# Patient Record
Sex: MALE | Race: WHITE | NOT HISPANIC OR LATINO | URBAN - METROPOLITAN AREA
[De-identification: names, ages, dates, MRNs, and addresses within clinical notes are randomized per-mention and may not be internally consistent; named-entity substitution may affect disease eponyms.]

---

## 2017-11-26 ENCOUNTER — EMERGENCY (EMERGENCY)
Facility: HOSPITAL | Age: 62
LOS: 1 days | Discharge: HOME | End: 2017-11-26
Attending: EMERGENCY MEDICINE
Payer: COMMERCIAL

## 2017-11-26 VITALS
SYSTOLIC BLOOD PRESSURE: 143 MMHG | TEMPERATURE: 98 F | RESPIRATION RATE: 20 BRPM | OXYGEN SATURATION: 100 % | WEIGHT: 184.97 LBS | HEIGHT: 73 IN | HEART RATE: 96 BPM | DIASTOLIC BLOOD PRESSURE: 80 MMHG

## 2017-11-26 VITALS
SYSTOLIC BLOOD PRESSURE: 134 MMHG | RESPIRATION RATE: 16 BRPM | TEMPERATURE: 98 F | OXYGEN SATURATION: 100 % | HEART RATE: 94 BPM | DIASTOLIC BLOOD PRESSURE: 76 MMHG

## 2017-11-26 LAB
APPEARANCE UR: CLEAR — SIGNIFICANT CHANGE UP
BACTERIA # UR AUTO: ABNORMAL
BILIRUB UR-MCNC: NEGATIVE — SIGNIFICANT CHANGE UP
COLOR SPEC: YELLOW — SIGNIFICANT CHANGE UP
DIFF PNL FLD: ABNORMAL
GLUCOSE UR QL: NEGATIVE MG/DL — SIGNIFICANT CHANGE UP
KETONES UR-MCNC: ABNORMAL
LEUKOCYTE ESTERASE UR-ACNC: ABNORMAL
NITRITE UR-MCNC: NEGATIVE — SIGNIFICANT CHANGE UP
PH UR: 5 — SIGNIFICANT CHANGE UP (ref 5–8)
PROT UR-MCNC: 15 MG/DL
RBC CASTS # UR COMP ASSIST: ABNORMAL /HPF (ref 0–4)
SP GR SPEC: 1.02 — SIGNIFICANT CHANGE UP (ref 1.01–1.02)
UROBILINOGEN FLD QL: NEGATIVE MG/DL — SIGNIFICANT CHANGE UP
WBC UR QL: SIGNIFICANT CHANGE UP

## 2017-11-26 PROCEDURE — 76775 US EXAM ABDO BACK WALL LIM: CPT | Mod: 26

## 2017-11-26 PROCEDURE — 99284 EMERGENCY DEPT VISIT MOD MDM: CPT

## 2017-11-26 PROCEDURE — 81001 URINALYSIS AUTO W/SCOPE: CPT

## 2017-11-26 PROCEDURE — 96374 THER/PROPH/DIAG INJ IV PUSH: CPT

## 2017-11-26 PROCEDURE — 76775 US EXAM ABDO BACK WALL LIM: CPT

## 2017-11-26 PROCEDURE — 99284 EMERGENCY DEPT VISIT MOD MDM: CPT | Mod: 25

## 2017-11-26 RX ORDER — SODIUM CHLORIDE 9 MG/ML
3 INJECTION INTRAMUSCULAR; INTRAVENOUS; SUBCUTANEOUS EVERY 8 HOURS
Qty: 0 | Refills: 0 | Status: DISCONTINUED | OUTPATIENT
Start: 2017-11-26 | End: 2017-11-30

## 2017-11-26 RX ORDER — TRAMADOL HYDROCHLORIDE 50 MG/1
1 TABLET ORAL
Qty: 10 | Refills: 0 | OUTPATIENT
Start: 2017-11-26 | End: 2017-11-29

## 2017-11-26 RX ORDER — KETOROLAC TROMETHAMINE 30 MG/ML
30 SYRINGE (ML) INJECTION ONCE
Qty: 0 | Refills: 0 | Status: DISCONTINUED | OUTPATIENT
Start: 2017-11-26 | End: 2017-11-26

## 2017-11-26 RX ORDER — SODIUM CHLORIDE 9 MG/ML
1000 INJECTION INTRAMUSCULAR; INTRAVENOUS; SUBCUTANEOUS ONCE
Qty: 0 | Refills: 0 | Status: COMPLETED | OUTPATIENT
Start: 2017-11-26 | End: 2017-11-26

## 2017-11-26 RX ADMIN — Medication 30 MILLIGRAM(S): at 12:24

## 2017-11-26 RX ADMIN — Medication 30 MILLIGRAM(S): at 13:00

## 2017-11-26 RX ADMIN — SODIUM CHLORIDE 3 MILLILITER(S): 9 INJECTION INTRAMUSCULAR; INTRAVENOUS; SUBCUTANEOUS at 13:40

## 2017-11-26 RX ADMIN — SODIUM CHLORIDE 2000 MILLILITER(S): 9 INJECTION INTRAMUSCULAR; INTRAVENOUS; SUBCUTANEOUS at 12:25

## 2017-11-26 NOTE — ED ADULT NURSE NOTE - OBJECTIVE STATEMENT
pt reports groin and back pain, states hx of kidney stones. pt appears comfortable, AOX3, afebrile, even and unlabored resps, denies hematuria

## 2017-11-26 NOTE — ED STATDOCS - ATTENDING CONTRIBUTION TO CARE
I, Gemma Ayala, performed the initial face to face bedside interview with this patient regarding history of present illness, review of symptoms and relevant past medical, social and family history.  I completed an independent physical examination.  I was the initial provider who evaluated this patient. I have signed out the follow up of any pending tests (i.e. labs, radiological studies) to the ACP.  I have communicated the patient’s plan of care and disposition with the ACP.

## 2017-11-26 NOTE — ED STATDOCS - MEDICAL DECISION MAKING DETAILS
Hx of kidney stones presenting with flank pain radiating to groin similar to kidney stone in past. Appears comfortable. Will treat with Toradol, UA to r/o possible infected stone and renal US as pt has had multiple stones in the past.

## 2017-11-26 NOTE — ED STATDOCS - CHPI ED SYMPTOM NEG
no vomiting/no fever/no nausea/no hematuria/no dysuria/NO URINARY URGENCY, NO SORE THROAT, NO COUGH, NO CHEST PAIN, NO SHORTNESS OF BREATH, NO SWELLING TO LOWER EXTREMITIES

## 2017-11-26 NOTE — ED STATDOCS - PROGRESS NOTE DETAILS
I reviewed US findings. Negative for hydro. Pt US reviewed and Pt had no Hydronephrosis or Renal calculi. Pt denies nay current pain and states that the pain has resolved. Pt D/c and will F/U with Urologist as discussed. Pt made aware of Septated renal cyst and will F/U with urologist.

## 2017-11-26 NOTE — ED STATDOCS - OBJECTIVE STATEMENT
62 year old male, with hx of kidney stones x 7 years ago, presenting to the ED complaining of right flank pain that onset 3 days ago. Pt states that he his sx feel similar to those of his prior hx of kidney stones. He states that his pain worsened last night and began to radiate to his groin. Pt states he has had constipation for the past 2-3 days and has weak flow while urinating, but he denies having any nausea, vomiting, fever, chills, hematuria, dysuria, urinary urgency, sore throat, cough, chest pain, shortness of breath, or swelling to his lower extremities. He denies having any other pertinent PMHx and states that he has NKDA. Pt states that he took Aleve for his pain last night at 0000. He states that his PSHx includes orthopedic surgery. No further complaints at this time.

## 2017-11-28 ENCOUNTER — EMERGENCY (EMERGENCY)
Facility: HOSPITAL | Age: 62
LOS: 1 days | Discharge: HOME | End: 2017-11-28
Attending: STUDENT IN AN ORGANIZED HEALTH CARE EDUCATION/TRAINING PROGRAM | Admitting: STUDENT IN AN ORGANIZED HEALTH CARE EDUCATION/TRAINING PROGRAM
Payer: COMMERCIAL

## 2017-11-28 VITALS
RESPIRATION RATE: 20 BRPM | OXYGEN SATURATION: 100 % | HEIGHT: 73 IN | TEMPERATURE: 98 F | WEIGHT: 184.97 LBS | DIASTOLIC BLOOD PRESSURE: 82 MMHG | SYSTOLIC BLOOD PRESSURE: 143 MMHG | HEART RATE: 88 BPM

## 2017-11-28 LAB
ALBUMIN SERPL ELPH-MCNC: 4.3 G/DL — SIGNIFICANT CHANGE UP (ref 3.3–5.2)
ALP SERPL-CCNC: 68 U/L — SIGNIFICANT CHANGE UP (ref 40–120)
ALT FLD-CCNC: 20 U/L — SIGNIFICANT CHANGE UP
ANION GAP SERPL CALC-SCNC: 12 MMOL/L — SIGNIFICANT CHANGE UP (ref 5–17)
APPEARANCE UR: CLEAR — SIGNIFICANT CHANGE UP
AST SERPL-CCNC: 22 U/L — SIGNIFICANT CHANGE UP
BACTERIA # UR AUTO: ABNORMAL
BASOPHILS # BLD AUTO: 0 K/UL — SIGNIFICANT CHANGE UP (ref 0–0.2)
BASOPHILS NFR BLD AUTO: 0.1 % — SIGNIFICANT CHANGE UP (ref 0–2)
BILIRUB SERPL-MCNC: 0.4 MG/DL — SIGNIFICANT CHANGE UP (ref 0.4–2)
BILIRUB UR-MCNC: NEGATIVE — SIGNIFICANT CHANGE UP
BUN SERPL-MCNC: 22 MG/DL — HIGH (ref 8–20)
CALCIUM SERPL-MCNC: 9.9 MG/DL — SIGNIFICANT CHANGE UP (ref 8.6–10.2)
CHLORIDE SERPL-SCNC: 97 MMOL/L — LOW (ref 98–107)
CO2 SERPL-SCNC: 26 MMOL/L — SIGNIFICANT CHANGE UP (ref 22–29)
COLOR SPEC: YELLOW — SIGNIFICANT CHANGE UP
CREAT SERPL-MCNC: 1.98 MG/DL — HIGH (ref 0.5–1.3)
DIFF PNL FLD: ABNORMAL
EOSINOPHIL # BLD AUTO: 0.2 K/UL — SIGNIFICANT CHANGE UP (ref 0–0.5)
EOSINOPHIL NFR BLD AUTO: 3 % — SIGNIFICANT CHANGE UP (ref 0–5)
EPI CELLS # UR: SIGNIFICANT CHANGE UP
GLUCOSE SERPL-MCNC: 86 MG/DL — SIGNIFICANT CHANGE UP (ref 70–115)
GLUCOSE UR QL: NEGATIVE MG/DL — SIGNIFICANT CHANGE UP
HCT VFR BLD CALC: 46.3 % — SIGNIFICANT CHANGE UP (ref 42–52)
HGB BLD-MCNC: 15.9 G/DL — SIGNIFICANT CHANGE UP (ref 14–18)
KETONES UR-MCNC: ABNORMAL
LEUKOCYTE ESTERASE UR-ACNC: ABNORMAL
LIDOCAIN IGE QN: 35 U/L — SIGNIFICANT CHANGE UP (ref 22–51)
LYMPHOCYTES # BLD AUTO: 1.4 K/UL — SIGNIFICANT CHANGE UP (ref 1–4.8)
LYMPHOCYTES # BLD AUTO: 19.6 % — LOW (ref 20–55)
MCHC RBC-ENTMCNC: 30.1 PG — SIGNIFICANT CHANGE UP (ref 27–31)
MCHC RBC-ENTMCNC: 34.3 G/DL — SIGNIFICANT CHANGE UP (ref 32–36)
MCV RBC AUTO: 87.7 FL — SIGNIFICANT CHANGE UP (ref 80–94)
MONOCYTES # BLD AUTO: 1.2 K/UL — HIGH (ref 0–0.8)
MONOCYTES NFR BLD AUTO: 16.7 % — HIGH (ref 3–10)
NEUTROPHILS # BLD AUTO: 4.4 K/UL — SIGNIFICANT CHANGE UP (ref 1.8–8)
NEUTROPHILS NFR BLD AUTO: 60.1 % — SIGNIFICANT CHANGE UP (ref 37–73)
NITRITE UR-MCNC: NEGATIVE — SIGNIFICANT CHANGE UP
PH UR: 5 — SIGNIFICANT CHANGE UP (ref 5–8)
PLATELET # BLD AUTO: 200 K/UL — SIGNIFICANT CHANGE UP (ref 150–400)
POTASSIUM SERPL-MCNC: 4.9 MMOL/L — SIGNIFICANT CHANGE UP (ref 3.5–5.3)
POTASSIUM SERPL-SCNC: 4.9 MMOL/L — SIGNIFICANT CHANGE UP (ref 3.5–5.3)
PROT SERPL-MCNC: 7.7 G/DL — SIGNIFICANT CHANGE UP (ref 6.6–8.7)
PROT UR-MCNC: 30 MG/DL
RBC # BLD: 5.28 M/UL — SIGNIFICANT CHANGE UP (ref 4.6–6.2)
RBC # FLD: 13.4 % — SIGNIFICANT CHANGE UP (ref 11–15.6)
RBC CASTS # UR COMP ASSIST: ABNORMAL /HPF (ref 0–4)
SODIUM SERPL-SCNC: 135 MMOL/L — SIGNIFICANT CHANGE UP (ref 135–145)
SP GR SPEC: 1.02 — SIGNIFICANT CHANGE UP (ref 1.01–1.02)
UROBILINOGEN FLD QL: 1 MG/DL
WBC # BLD: 7.4 K/UL — SIGNIFICANT CHANGE UP (ref 4.8–10.8)
WBC # FLD AUTO: 7.4 K/UL — SIGNIFICANT CHANGE UP (ref 4.8–10.8)
WBC UR QL: SIGNIFICANT CHANGE UP

## 2017-11-28 PROCEDURE — 36415 COLL VENOUS BLD VENIPUNCTURE: CPT

## 2017-11-28 PROCEDURE — 74176 CT ABD & PELVIS W/O CONTRAST: CPT | Mod: 26

## 2017-11-28 PROCEDURE — 81001 URINALYSIS AUTO W/SCOPE: CPT

## 2017-11-28 PROCEDURE — 99284 EMERGENCY DEPT VISIT MOD MDM: CPT | Mod: 25

## 2017-11-28 PROCEDURE — 80053 COMPREHEN METABOLIC PANEL: CPT

## 2017-11-28 PROCEDURE — 74176 CT ABD & PELVIS W/O CONTRAST: CPT

## 2017-11-28 PROCEDURE — 83690 ASSAY OF LIPASE: CPT

## 2017-11-28 PROCEDURE — 85027 COMPLETE CBC AUTOMATED: CPT

## 2017-11-28 RX ORDER — TAMSULOSIN HYDROCHLORIDE 0.4 MG/1
1 CAPSULE ORAL
Qty: 5 | Refills: 0 | OUTPATIENT
Start: 2017-11-28 | End: 2017-12-03

## 2017-11-28 RX ORDER — TAMSULOSIN HYDROCHLORIDE 0.4 MG/1
1 CAPSULE ORAL
Qty: 7 | Refills: 0 | OUTPATIENT
Start: 2017-11-28 | End: 2017-12-05

## 2017-11-28 RX ORDER — SODIUM CHLORIDE 9 MG/ML
1000 INJECTION INTRAMUSCULAR; INTRAVENOUS; SUBCUTANEOUS
Qty: 0 | Refills: 0 | Status: DISCONTINUED | OUTPATIENT
Start: 2017-11-28 | End: 2017-12-02

## 2017-11-28 RX ORDER — SODIUM CHLORIDE 9 MG/ML
3 INJECTION INTRAMUSCULAR; INTRAVENOUS; SUBCUTANEOUS ONCE
Qty: 0 | Refills: 0 | Status: COMPLETED | OUTPATIENT
Start: 2017-11-28 | End: 2017-11-28

## 2017-11-28 RX ORDER — SODIUM CHLORIDE 9 MG/ML
1000 INJECTION INTRAMUSCULAR; INTRAVENOUS; SUBCUTANEOUS ONCE
Qty: 0 | Refills: 0 | Status: COMPLETED | OUTPATIENT
Start: 2017-11-28 | End: 2017-11-28

## 2017-11-28 RX ADMIN — SODIUM CHLORIDE 3 MILLILITER(S): 9 INJECTION INTRAMUSCULAR; INTRAVENOUS; SUBCUTANEOUS at 04:36

## 2017-11-28 RX ADMIN — SODIUM CHLORIDE 125 MILLILITER(S): 9 INJECTION INTRAMUSCULAR; INTRAVENOUS; SUBCUTANEOUS at 06:09

## 2017-11-28 RX ADMIN — SODIUM CHLORIDE 1000 MILLILITER(S): 9 INJECTION INTRAMUSCULAR; INTRAVENOUS; SUBCUTANEOUS at 04:07

## 2017-11-28 NOTE — ED PROVIDER NOTE - OBJECTIVE STATEMENT
63 y/o male with PMHx kidney stones, and PSHx hernia repair presents to the ED with c/o right sided flank pain, onset yesterday. Pt states he was evaluated at [facility] for similar complaints yesterday, US preformed found within normal limits, pt was d/c home with Tramadol for pain control. Pt states medication has not alleviated pain. Attempted to alleviate with muscle relaxers, which relief of pain. Pt states at worse pain was an 8/10, in the ED pt states pain is a 2/10. Pt reports constipation, approximately 2 minor bowel movements in the past 4 days. Per pt sitting aggregates pain, standing alleviates pain. Denies n/v/d, fever, and any other acute symptoms and complaints at this time. 63 y/o male with PMHx kidney stones, and PSHx hernia repair presents to the ED with c/o intermittent right sided flank pain, onset this past Sunday yesterday. Pt states he was evaluated at [facility] for similar complaints yesterday, US preformed found within normal limits, pt was d/c home with Tramadol for pain control. Pt states medication has not alleviated pain. Attempted to alleviate with muscle relaxers, which relief of pain. Pt states at worse pain was an 8/10, in the ED pt states pain is a 2/10. Pt reports constipation, approximately 2 minor bowel movements in the past 4 days. Per pt sitting aggregates pain, standing alleviates pain. Denies n/v/d, fever, and any other acute symptoms and complaints at this time.

## 2017-11-28 NOTE — ED PROVIDER NOTE - PROGRESS NOTE DETAILS
Patient feeling better. Results discussed patient cautioned against nsaid use and will need for follow-up with urologist.

## 2017-11-28 NOTE — ED PROVIDER NOTE - MEDICAL DECISION MAKING DETAILS
Will evaluate for cause of recurrent pain, likely secondary to renal colic given hx and presentation.

## 2017-11-28 NOTE — ED ADULT NURSE NOTE - OBJECTIVE STATEMENT
Pt received in A6R c/o r flank pain and a feeling of "low urine pressure" since thursday night. Pt was seen here on Sunday for same complaint. Pt states the pain comes and goes. Pt denies nausea, vomiting, fever/chills, pain upon urination, urinary retention.

## 2017-11-29 ENCOUNTER — INPATIENT (INPATIENT)
Facility: HOSPITAL | Age: 62
LOS: 4 days | Discharge: HOME | DRG: 694 | End: 2017-12-04
Attending: HOSPITALIST | Admitting: INTERNAL MEDICINE
Payer: COMMERCIAL

## 2017-11-29 VITALS
HEART RATE: 94 BPM | SYSTOLIC BLOOD PRESSURE: 134 MMHG | HEIGHT: 73 IN | OXYGEN SATURATION: 99 % | WEIGHT: 190.04 LBS | DIASTOLIC BLOOD PRESSURE: 86 MMHG | TEMPERATURE: 98 F | RESPIRATION RATE: 18 BRPM

## 2017-11-29 DIAGNOSIS — N20.0 CALCULUS OF KIDNEY: ICD-10-CM

## 2017-11-29 LAB
ANION GAP SERPL CALC-SCNC: 14 MMOL/L — SIGNIFICANT CHANGE UP (ref 5–17)
APPEARANCE UR: CLEAR — SIGNIFICANT CHANGE UP
BILIRUB UR-MCNC: NEGATIVE — SIGNIFICANT CHANGE UP
BUN SERPL-MCNC: 15 MG/DL — SIGNIFICANT CHANGE UP (ref 8–20)
CALCIUM SERPL-MCNC: 9.9 MG/DL — SIGNIFICANT CHANGE UP (ref 8.6–10.2)
CHLORIDE SERPL-SCNC: 100 MMOL/L — SIGNIFICANT CHANGE UP (ref 98–107)
CO2 SERPL-SCNC: 26 MMOL/L — SIGNIFICANT CHANGE UP (ref 22–29)
COLOR SPEC: YELLOW — SIGNIFICANT CHANGE UP
CREAT SERPL-MCNC: 1.98 MG/DL — HIGH (ref 0.5–1.3)
DIFF PNL FLD: NEGATIVE — SIGNIFICANT CHANGE UP
GLUCOSE SERPL-MCNC: 113 MG/DL — SIGNIFICANT CHANGE UP (ref 70–115)
GLUCOSE UR QL: NEGATIVE MG/DL — SIGNIFICANT CHANGE UP
HCT VFR BLD CALC: 45.1 % — SIGNIFICANT CHANGE UP (ref 42–52)
HGB BLD-MCNC: 14.6 G/DL — SIGNIFICANT CHANGE UP (ref 14–18)
KETONES UR-MCNC: NEGATIVE — SIGNIFICANT CHANGE UP
LEUKOCYTE ESTERASE UR-ACNC: NEGATIVE — SIGNIFICANT CHANGE UP
MCHC RBC-ENTMCNC: 27.9 PG — SIGNIFICANT CHANGE UP (ref 27–31)
MCHC RBC-ENTMCNC: 32.4 G/DL — SIGNIFICANT CHANGE UP (ref 32–36)
MCV RBC AUTO: 86.2 FL — SIGNIFICANT CHANGE UP (ref 80–94)
NITRITE UR-MCNC: NEGATIVE — SIGNIFICANT CHANGE UP
PH UR: 5 — SIGNIFICANT CHANGE UP (ref 5–8)
PLATELET # BLD AUTO: 193 K/UL — SIGNIFICANT CHANGE UP (ref 150–400)
POTASSIUM SERPL-MCNC: 4.6 MMOL/L — SIGNIFICANT CHANGE UP (ref 3.5–5.3)
POTASSIUM SERPL-SCNC: 4.6 MMOL/L — SIGNIFICANT CHANGE UP (ref 3.5–5.3)
PROT UR-MCNC: NEGATIVE MG/DL — SIGNIFICANT CHANGE UP
RBC # BLD: 5.23 M/UL — SIGNIFICANT CHANGE UP (ref 4.6–6.2)
RBC # FLD: 12.8 % — SIGNIFICANT CHANGE UP (ref 11–15.6)
SODIUM SERPL-SCNC: 140 MMOL/L — SIGNIFICANT CHANGE UP (ref 135–145)
SP GR SPEC: 1.01 — SIGNIFICANT CHANGE UP (ref 1.01–1.02)
UROBILINOGEN FLD QL: NEGATIVE MG/DL — SIGNIFICANT CHANGE UP
WBC # BLD: 8.2 K/UL — SIGNIFICANT CHANGE UP (ref 4.8–10.8)
WBC # FLD AUTO: 8.2 K/UL — SIGNIFICANT CHANGE UP (ref 4.8–10.8)

## 2017-11-29 PROCEDURE — 74176 CT ABD & PELVIS W/O CONTRAST: CPT | Mod: 26

## 2017-11-29 PROCEDURE — 99285 EMERGENCY DEPT VISIT HI MDM: CPT

## 2017-11-29 PROCEDURE — 71010: CPT | Mod: 26

## 2017-11-29 RX ORDER — ENOXAPARIN SODIUM 100 MG/ML
40 INJECTION SUBCUTANEOUS EVERY 24 HOURS
Qty: 0 | Refills: 0 | Status: DISCONTINUED | OUTPATIENT
Start: 2017-11-29 | End: 2017-12-01

## 2017-11-29 RX ORDER — MORPHINE SULFATE 50 MG/1
2 CAPSULE, EXTENDED RELEASE ORAL EVERY 6 HOURS
Qty: 0 | Refills: 0 | Status: DISCONTINUED | OUTPATIENT
Start: 2017-11-29 | End: 2017-12-01

## 2017-11-29 RX ORDER — TAMSULOSIN HYDROCHLORIDE 0.4 MG/1
0.4 CAPSULE ORAL AT BEDTIME
Qty: 0 | Refills: 0 | Status: DISCONTINUED | OUTPATIENT
Start: 2017-11-30 | End: 2017-12-01

## 2017-11-29 RX ORDER — SODIUM CHLORIDE 9 MG/ML
1000 INJECTION, SOLUTION INTRAVENOUS
Qty: 0 | Refills: 0 | Status: DISCONTINUED | OUTPATIENT
Start: 2017-11-29 | End: 2017-12-01

## 2017-11-29 RX ORDER — SODIUM CHLORIDE 9 MG/ML
1000 INJECTION INTRAMUSCULAR; INTRAVENOUS; SUBCUTANEOUS ONCE
Qty: 0 | Refills: 0 | Status: COMPLETED | OUTPATIENT
Start: 2017-11-29 | End: 2017-11-29

## 2017-11-29 RX ORDER — OXYCODONE AND ACETAMINOPHEN 5; 325 MG/1; MG/1
2 TABLET ORAL EVERY 6 HOURS
Qty: 0 | Refills: 0 | Status: DISCONTINUED | OUTPATIENT
Start: 2017-11-29 | End: 2017-12-01

## 2017-11-29 RX ORDER — SODIUM CHLORIDE 9 MG/ML
3 INJECTION INTRAMUSCULAR; INTRAVENOUS; SUBCUTANEOUS ONCE
Qty: 0 | Refills: 0 | Status: COMPLETED | OUTPATIENT
Start: 2017-11-29 | End: 2017-11-29

## 2017-11-29 RX ORDER — MORPHINE SULFATE 50 MG/1
4 CAPSULE, EXTENDED RELEASE ORAL ONCE
Qty: 0 | Refills: 0 | Status: DISCONTINUED | OUTPATIENT
Start: 2017-11-29 | End: 2017-11-29

## 2017-11-29 RX ORDER — HYDROMORPHONE HYDROCHLORIDE 2 MG/ML
2 INJECTION INTRAMUSCULAR; INTRAVENOUS; SUBCUTANEOUS ONCE
Qty: 0 | Refills: 0 | Status: DISCONTINUED | OUTPATIENT
Start: 2017-11-29 | End: 2017-11-29

## 2017-11-29 RX ADMIN — SODIUM CHLORIDE 3 MILLILITER(S): 9 INJECTION INTRAMUSCULAR; INTRAVENOUS; SUBCUTANEOUS at 23:04

## 2017-11-29 RX ADMIN — SODIUM CHLORIDE 2000 MILLILITER(S): 9 INJECTION INTRAMUSCULAR; INTRAVENOUS; SUBCUTANEOUS at 18:28

## 2017-11-29 RX ADMIN — MORPHINE SULFATE 4 MILLIGRAM(S): 50 CAPSULE, EXTENDED RELEASE ORAL at 18:28

## 2017-11-29 RX ADMIN — MORPHINE SULFATE 2 MILLIGRAM(S): 50 CAPSULE, EXTENDED RELEASE ORAL at 23:26

## 2017-11-29 RX ADMIN — SODIUM CHLORIDE 150 MILLILITER(S): 9 INJECTION, SOLUTION INTRAVENOUS at 23:26

## 2017-11-29 RX ADMIN — MORPHINE SULFATE 2 MILLIGRAM(S): 50 CAPSULE, EXTENDED RELEASE ORAL at 23:15

## 2017-11-29 RX ADMIN — HYDROMORPHONE HYDROCHLORIDE 2 MILLIGRAM(S): 2 INJECTION INTRAMUSCULAR; INTRAVENOUS; SUBCUTANEOUS at 20:11

## 2017-11-29 NOTE — ED ADULT NURSE NOTE - OBJECTIVE STATEMENT
Pt was diagnosed with kidney stones. Was seen here last night and sent home with Percocet and Flomax, pain is getting worse. Pt states that he doesn't think he passed a stone.

## 2017-11-29 NOTE — ED STATDOCS - PROGRESS NOTE DETAILS
urology consulted Dr. Damien Saunders- [phone] - will see patient either tonight or tomorrow am PA NOTE: Pt seen by intake physician and hpi/orders/plan reviewed. PT presenting to ED with complaints of right flank pain x 6 days ago.  Patient was seen here yesterday and diagnosed with a kidney stone and d/c'd with pain meds and urology follow up.  Patient is back due to worsening symptoms.  PE: GEN: Awake, alert,  NAD,  EYES: PERRL CARDIAC: Reg rate and rhythm, S1,S2, RRR  RESP: No distress noted. Lungs CTA bilaterally no wheeze, ronchi, rales. ABD: soft,  non-tender, no guarding. . NEURO: AOx3, no focal deficits   PLAN: labs, CT and urology consult

## 2017-11-29 NOTE — ED STATDOCS - OBJECTIVE STATEMENT
62 year old male, with hx of kidney stones, presenting to the ED complaining of right flank pain that onset 6 days ago. Pt states that he has visited the ED twice in the past 3 days and had an US performed which was negative. He states that he was treated with Tramadol, which did not relieve his pain yesterday. Pt states that he returned to the ED yesterday and had a CT performed which was positive for a 3mm kidney stone that is close to the bladder. He states that he was then prescribed oxycodone and acetaminophen, which brought relief for about 12 hours. Pt states that he began to take the medication every 3 hours which again only brought slight relief. He states that he also has lower back pain associated with his flank pain. Pt states that he has had hx of abnormal creatinine levels. Pt states that he chews approximately 12 Advils and Aleves per week. He states that he has not been able to sleep due to his pain. Pt states that he has not followed up with a urologist for his sx yet. He states that he has been using hot compresses and taking hot baths to relieve his pain. Pt states that he has been taking Flomax for his pain as well. No further complaints at this time.

## 2017-11-29 NOTE — ED ADULT NURSE NOTE - CHIEF COMPLAINT QUOTE
Pt was seen her twice for a kidney stone, discharged yesterday morning.  Pt states his pain level now is 6/10

## 2017-11-29 NOTE — ED STATDOCS - ATTENDING CONTRIBUTION TO CARE
I, Nas Stoner, performed the initial face to face bedside interview with this patient regarding history of present illness, review of symptoms and relevant past medical, social and family history.  I completed an independent physical examination.  I was the initial provider who evaluated this patient. I have signed out the follow up of any pending tests (i.e. labs, radiological studies) to the ACP.  I have communicated the patient’s plan of care and disposition with the ACP.

## 2017-11-29 NOTE — ED STATDOCS - CARE PLAN
Principal Discharge DX:	Kidney stones Principal Discharge DX:	Kidney stones  Secondary Diagnosis:	Hydronephrosis due to obstruction of ureter  Secondary Diagnosis:	ELDON (acute kidney injury)

## 2017-11-29 NOTE — ED STATDOCS - MEDICAL DECISION MAKING DETAILS
Will treat with hydration, pain control, and re-image to evaluate for progression or worsening hydro given abnormal renal function. No NSAIDs due to kidney function.

## 2017-11-30 DIAGNOSIS — Z98.890 OTHER SPECIFIED POSTPROCEDURAL STATES: Chronic | ICD-10-CM

## 2017-11-30 DIAGNOSIS — N17.9 ACUTE KIDNEY FAILURE, UNSPECIFIED: ICD-10-CM

## 2017-11-30 DIAGNOSIS — R52 PAIN, UNSPECIFIED: ICD-10-CM

## 2017-11-30 DIAGNOSIS — N13.30 UNSPECIFIED HYDRONEPHROSIS: ICD-10-CM

## 2017-11-30 DIAGNOSIS — Z29.9 ENCOUNTER FOR PROPHYLACTIC MEASURES, UNSPECIFIED: ICD-10-CM

## 2017-11-30 DIAGNOSIS — N20.0 CALCULUS OF KIDNEY: ICD-10-CM

## 2017-11-30 LAB
ALBUMIN SERPL ELPH-MCNC: 3.5 G/DL — SIGNIFICANT CHANGE UP (ref 3.3–5.2)
ALP SERPL-CCNC: 53 U/L — SIGNIFICANT CHANGE UP (ref 40–120)
ALT FLD-CCNC: 13 U/L — SIGNIFICANT CHANGE UP
ANION GAP SERPL CALC-SCNC: 16 MMOL/L — SIGNIFICANT CHANGE UP (ref 5–17)
APTT BLD: 26.2 SEC — LOW (ref 27.5–37.4)
AST SERPL-CCNC: 16 U/L — SIGNIFICANT CHANGE UP
BASOPHILS # BLD AUTO: 0 K/UL — SIGNIFICANT CHANGE UP (ref 0–0.2)
BASOPHILS NFR BLD AUTO: 0.2 % — SIGNIFICANT CHANGE UP (ref 0–2)
BILIRUB SERPL-MCNC: 0.9 MG/DL — SIGNIFICANT CHANGE UP (ref 0.4–2)
BUN SERPL-MCNC: 13 MG/DL — SIGNIFICANT CHANGE UP (ref 8–20)
CALCIUM SERPL-MCNC: 8.8 MG/DL — SIGNIFICANT CHANGE UP (ref 8.6–10.2)
CHLORIDE SERPL-SCNC: 100 MMOL/L — SIGNIFICANT CHANGE UP (ref 98–107)
CO2 SERPL-SCNC: 22 MMOL/L — SIGNIFICANT CHANGE UP (ref 22–29)
CREAT SERPL-MCNC: 1.74 MG/DL — HIGH (ref 0.5–1.3)
EOSINOPHIL # BLD AUTO: 0 K/UL — SIGNIFICANT CHANGE UP (ref 0–0.5)
EOSINOPHIL NFR BLD AUTO: 0.8 % — SIGNIFICANT CHANGE UP (ref 0–5)
GLUCOSE SERPL-MCNC: 106 MG/DL — SIGNIFICANT CHANGE UP (ref 70–115)
HCT VFR BLD CALC: 39.9 % — LOW (ref 42–52)
HGB BLD-MCNC: 14.2 G/DL — SIGNIFICANT CHANGE UP (ref 14–18)
INR BLD: 1.11 RATIO — SIGNIFICANT CHANGE UP (ref 0.88–1.16)
LYMPHOCYTES # BLD AUTO: 1.3 K/UL — SIGNIFICANT CHANGE UP (ref 1–4.8)
LYMPHOCYTES # BLD AUTO: 19.8 % — LOW (ref 20–55)
MAGNESIUM SERPL-MCNC: 2.2 MG/DL — SIGNIFICANT CHANGE UP (ref 1.6–2.6)
MCHC RBC-ENTMCNC: 30.5 PG — SIGNIFICANT CHANGE UP (ref 27–31)
MCHC RBC-ENTMCNC: 35.6 G/DL — SIGNIFICANT CHANGE UP (ref 32–36)
MCV RBC AUTO: 85.6 FL — SIGNIFICANT CHANGE UP (ref 80–94)
MONOCYTES # BLD AUTO: 0.8 K/UL — SIGNIFICANT CHANGE UP (ref 0–0.8)
MONOCYTES NFR BLD AUTO: 12.7 % — HIGH (ref 3–10)
NEUTROPHILS # BLD AUTO: 4.2 K/UL — SIGNIFICANT CHANGE UP (ref 1.8–8)
NEUTROPHILS NFR BLD AUTO: 66 % — SIGNIFICANT CHANGE UP (ref 37–73)
PHOSPHATE SERPL-MCNC: 2.6 MG/DL — SIGNIFICANT CHANGE UP (ref 2.4–4.7)
PLATELET # BLD AUTO: 157 K/UL — SIGNIFICANT CHANGE UP (ref 150–400)
POTASSIUM SERPL-MCNC: 4.7 MMOL/L — SIGNIFICANT CHANGE UP (ref 3.5–5.3)
POTASSIUM SERPL-SCNC: 4.7 MMOL/L — SIGNIFICANT CHANGE UP (ref 3.5–5.3)
PROT SERPL-MCNC: 6.7 G/DL — SIGNIFICANT CHANGE UP (ref 6.6–8.7)
PROTHROM AB SERPL-ACNC: 12.2 SEC — SIGNIFICANT CHANGE UP (ref 9.8–12.7)
RBC # BLD: 4.66 M/UL — SIGNIFICANT CHANGE UP (ref 4.6–6.2)
RBC # FLD: 13 % — SIGNIFICANT CHANGE UP (ref 11–15.6)
SODIUM SERPL-SCNC: 138 MMOL/L — SIGNIFICANT CHANGE UP (ref 135–145)
WBC # BLD: 6.4 K/UL — SIGNIFICANT CHANGE UP (ref 4.8–10.8)
WBC # FLD AUTO: 6.4 K/UL — SIGNIFICANT CHANGE UP (ref 4.8–10.8)

## 2017-11-30 PROCEDURE — 99252 IP/OBS CONSLTJ NEW/EST SF 35: CPT

## 2017-11-30 PROCEDURE — 93010 ELECTROCARDIOGRAM REPORT: CPT

## 2017-11-30 PROCEDURE — 99233 SBSQ HOSP IP/OBS HIGH 50: CPT | Mod: GC

## 2017-11-30 RX ORDER — FAMOTIDINE 10 MG/ML
20 INJECTION INTRAVENOUS DAILY
Qty: 0 | Refills: 0 | Status: DISCONTINUED | OUTPATIENT
Start: 2017-11-30 | End: 2017-12-01

## 2017-11-30 RX ORDER — ACETAMINOPHEN 500 MG
1000 TABLET ORAL ONCE
Qty: 0 | Refills: 0 | Status: COMPLETED | OUTPATIENT
Start: 2017-11-30 | End: 2017-11-30

## 2017-11-30 RX ORDER — POLYETHYLENE GLYCOL 3350 17 G/17G
17 POWDER, FOR SOLUTION ORAL DAILY
Qty: 0 | Refills: 0 | Status: DISCONTINUED | OUTPATIENT
Start: 2017-11-30 | End: 2017-12-01

## 2017-11-30 RX ORDER — INFLUENZA VIRUS VACCINE 15; 15; 15; 15 UG/.5ML; UG/.5ML; UG/.5ML; UG/.5ML
0.5 SUSPENSION INTRAMUSCULAR ONCE
Qty: 0 | Refills: 0 | Status: COMPLETED | OUTPATIENT
Start: 2017-11-30 | End: 2017-11-30

## 2017-11-30 RX ADMIN — OXYCODONE AND ACETAMINOPHEN 2 TABLET(S): 5; 325 TABLET ORAL at 22:26

## 2017-11-30 RX ADMIN — OXYCODONE AND ACETAMINOPHEN 2 TABLET(S): 5; 325 TABLET ORAL at 09:03

## 2017-11-30 RX ADMIN — OXYCODONE AND ACETAMINOPHEN 2 TABLET(S): 5; 325 TABLET ORAL at 14:03

## 2017-11-30 RX ADMIN — OXYCODONE AND ACETAMINOPHEN 2 TABLET(S): 5; 325 TABLET ORAL at 23:20

## 2017-11-30 RX ADMIN — Medication 1000 MILLIGRAM(S): at 19:12

## 2017-11-30 RX ADMIN — MORPHINE SULFATE 2 MILLIGRAM(S): 50 CAPSULE, EXTENDED RELEASE ORAL at 05:36

## 2017-11-30 RX ADMIN — MORPHINE SULFATE 2 MILLIGRAM(S): 50 CAPSULE, EXTENDED RELEASE ORAL at 20:15

## 2017-11-30 RX ADMIN — OXYCODONE AND ACETAMINOPHEN 2 TABLET(S): 5; 325 TABLET ORAL at 14:30

## 2017-11-30 RX ADMIN — SODIUM CHLORIDE 150 MILLILITER(S): 9 INJECTION, SOLUTION INTRAVENOUS at 20:15

## 2017-11-30 RX ADMIN — MORPHINE SULFATE 2 MILLIGRAM(S): 50 CAPSULE, EXTENDED RELEASE ORAL at 13:37

## 2017-11-30 RX ADMIN — TAMSULOSIN HYDROCHLORIDE 0.4 MILLIGRAM(S): 0.4 CAPSULE ORAL at 11:21

## 2017-11-30 RX ADMIN — MORPHINE SULFATE 2 MILLIGRAM(S): 50 CAPSULE, EXTENDED RELEASE ORAL at 12:44

## 2017-11-30 RX ADMIN — SODIUM CHLORIDE 150 MILLILITER(S): 9 INJECTION, SOLUTION INTRAVENOUS at 16:15

## 2017-11-30 RX ADMIN — OXYCODONE AND ACETAMINOPHEN 2 TABLET(S): 5; 325 TABLET ORAL at 11:17

## 2017-11-30 RX ADMIN — MORPHINE SULFATE 2 MILLIGRAM(S): 50 CAPSULE, EXTENDED RELEASE ORAL at 05:53

## 2017-11-30 RX ADMIN — Medication 400 MILLIGRAM(S): at 17:40

## 2017-11-30 RX ADMIN — MORPHINE SULFATE 2 MILLIGRAM(S): 50 CAPSULE, EXTENDED RELEASE ORAL at 20:03

## 2017-11-30 NOTE — H&P ADULT - ASSESSMENT
62 year old male with PMH of renal stones 7 year prior with 3mm right obstructing calculus and intractable pain.

## 2017-11-30 NOTE — H&P ADULT - NSHPPHYSICALEXAM_GEN_ALL_CORE
Vital Signs Last 24 Hrs  T(C): 36.9 (30 Nov 2017 00:39), Max: 36.9 (29 Nov 2017 16:22)  T(F): 98.4 (30 Nov 2017 00:39), Max: 98.4 (29 Nov 2017 16:22)  HR: 92 (30 Nov 2017 00:39) (90 - 94)  BP: 147/82 (30 Nov 2017 00:39) (134/86 - 156/90)  RR: 18 (30 Nov 2017 00:39) (17 - 18)  SpO2: 98% (30 Nov 2017 00:39) (98% - 100%)    General: NAD, sitting in chair  HEENT: NCAT, PERRLA, pupils 3mm bilaterally, no lymphadenopathy  Cardiovascular: RRR, +S1/S2, no murmurs appreciated  Respiratory: Equal air entry bilaterally, no wheeze, rales, rhonchi  Abdomen: +BS, soft, ND, ND, no rebound, guarding or rigidity  Back: Full ROM, no scoliosis noted, no CVA tenderness  Extremities: No cyanosis, edema or calf tenderness. +DP/PT pulses bilaterally

## 2017-11-30 NOTE — PROGRESS NOTE ADULT - SUBJECTIVE AND OBJECTIVE BOX
patient having lots of pain.  Will make NPO after midnight in case he is unable to pass stone and we can perform ureteroscopy.

## 2017-11-30 NOTE — H&P ADULT - PROBLEM SELECTOR PLAN 4
Cr 1.98 stable since 11/28/17  Likely related to Toradol use and obstruction.   Will continue with hydration, Avoid NSAID use.   Continue to monitor.

## 2017-11-30 NOTE — H&P ADULT - HISTORY OF PRESENT ILLNESS
62 year old male with PMH of renal stone 7 years prior, presenting to the ER with right sided flank pain. Patient has experienced 1 week history of flank pain, was sudden onset with radiation to right groin. Patient characterized pain as colicky, 8/10 in intensity and was getting progressively worse. Patient was seen in ER at [facility] 1 day prior for similar complaints. Patient was found to have 3mm right sided uretral stone with mild hydronephrosis. Pain was alleviated with Toradol, and was discharged with percocet. Once home patient started re-experiencing pain and was using Pain medication every 3 hours until finally he decided to return to the ER.   Denies fever, chills, hematuria, any passage of stones and states the symptoms are similar to 7 years prior.

## 2017-11-30 NOTE — H&P ADULT - PROBLEM SELECTOR PLAN 1
Intractable pain secondary to right sided 3mm renal calculus causing hydroureteronephrosis   Admit to Dr Do  Any Bed  Regular Diet  Morphine 2mg IVP q6h PRN severe pain  Percocet 5/325mg 2tabs q6h PRN moderate pain

## 2017-11-30 NOTE — H&P ADULT - PROBLEM SELECTOR PLAN 2
Flomax 0.4mg PO qhs  Famotidine 20mg PO daily  IVF at 150cc/hr  Ua unremarkable for infection  Strain urine for calculi  Urology consulted; Dr Saunders recommends optimal pain control and Flomax with hydration. Will evaluate patient in am.

## 2017-11-30 NOTE — PROGRESS NOTE ADULT - SUBJECTIVE AND OBJECTIVE BOX
Patient is a 62y Male who presents with a chief complaint of Right Flank pain (30 Nov 2017 01:08)    Patient seen and examined at bedside, No acute overnight events. Today says rt flank pain persists but improved. He denies fever, chest pain, SOB, abdominal pain, diarrhea, constipation, calf pain.    ROS: Neg except as above.    VS:   Vital Signs Last 24 Hrs  T(C): 37.4 (30 Nov 2017 04:45), Max: 37.4 (30 Nov 2017 04:45)  T(F): 99.4 (30 Nov 2017 04:45), Max: 99.4 (30 Nov 2017 04:45)  HR: 88 (30 Nov 2017 04:45) (88 - 94)  BP: 148/82 (30 Nov 2017 04:45) (134/86 - 156/90)  RR: 18 (30 Nov 2017 04:45) (17 - 18)  SpO2: 96% (30 Nov 2017 04:45) (96% - 100%)      PHYSICAL EXAM: Vital signs reviewed.  GENERAL: Appears well developed, well nourished alert and cooperative.  HEENT: Head; normocephalic, atraumatic, PERRLA, EOMI  NECK: Supple, no JVD or carotid bruit or thyromegaly.  CARDIOVASCULAR: Normal S1 and S2, No murmur, RRR. No edema  RESPIRATORY: No rales, rhonchi or wheeze. Normal breath sounds bilaterally.  GASTROINTESTINAL: Soft, nontender without mass or organomegaly. Nl BS  EXTREMITIES: No clubbing, cyanosis or edema. No calf tenderness. Distal pulses wnl.   MUSCULOSKELETAL: 5/5 muscle strength in UE and LE.   SKIN: warm and dry with normal turgor.  NEURO: Alert/oriented x 3/normal motor exam. CN 2-12 intact. No pathologic reflexes.    PSYCH: normal affect.    Labs:                        14.6   8.2   )-----------( 193      ( 29 Nov 2017 18:43 )             45.1   11-29    140  |  100  |  15.0  ----------------------------<  113  4.6   |  26.0  |  1.98<H>    Ca    9.9      29 Nov 2017 18:43          Radiology: Images reviewed by me.  CT- Renal Hunt      Medications:  MEDICATIONS  (STANDING):  enoxaparin Injectable 40 milliGRAM(s) SubCutaneous every 24 hours  famotidine    Tablet 20 milliGRAM(s) Oral daily  multiple electrolytes Injection Type 1 1000 milliLiter(s) (150 mL/Hr) IV Continuous <Continuous>  tamsulosin 0.4 milliGRAM(s) Oral at bedtime    MEDICATIONS  (PRN):  morphine  - Injectable 2 milliGRAM(s) IV Push every 6 hours PRN Severe Pain (7 - 10)  oxyCODONE    5 mG/acetaminophen 325 mG 2 Tablet(s) Oral every 6 hours PRN Moderate Pain (4 - 6)  polyethylene glycol 3350 17 Gram(s) Oral daily PRN Constipation Patient is a 62y Male who presents with a chief complaint of Right Flank pain (30 Nov 2017 01:08)    Patient seen and examined at bedside, No acute overnight events. Today says rt flank pain persists but improved. He denies fever, chest pain, SOB, abdominal pain, diarrhea, constipation, calf pain.    ROS: Neg except as above.    Vital Signs Last 24 Hrs  T(C): 37.4 (30 Nov 2017 04:45), Max: 37.4 (30 Nov 2017 04:45)  T(F): 99.4 (30 Nov 2017 04:45), Max: 99.4 (30 Nov 2017 04:45)  HR: 88 (30 Nov 2017 04:45) (88 - 94)  BP: 148/82 (30 Nov 2017 04:45) (134/86 - 156/90)  RR: 18 (30 Nov 2017 04:45) (17 - 18)  SpO2: 96% (30 Nov 2017 04:45) (96% - 100%)      PHYSICAL EXAM: Vital signs reviewed.  General: NAD  HEENT: NCAT, PERRLA, EOMI  Cardiovascular: RRR, +S1/S2, no murmurs  Respiratory: CTAB, no wheeze, rales, rhonchi  Abdomen: +BS, soft, ND, no rebound, guarding or rigidity  Back: Rt flank pain.  Extremities: No cyanosis, edema or calf tenderness    Labs:                        14.6   8.2   )-----------( 193      ( 29 Nov 2017 18:43 )             45.1   11-29    140  |  100  |  15.0  ----------------------------<  113  4.6   |  26.0  |  1.98<H>    Ca    9.9      29 Nov 2017 18:43          Radiology: Images reviewed by me.  CT- Renal Hunt      Medications:  MEDICATIONS  (STANDING):  enoxaparin Injectable 40 milliGRAM(s) SubCutaneous every 24 hours  famotidine    Tablet 20 milliGRAM(s) Oral daily  multiple electrolytes Injection Type 1 1000 milliLiter(s) (150 mL/Hr) IV Continuous <Continuous>  tamsulosin 0.4 milliGRAM(s) Oral at bedtime    MEDICATIONS  (PRN):  morphine  - Injectable 2 milliGRAM(s) IV Push every 6 hours PRN Severe Pain (7 - 10)  oxyCODONE    5 mG/acetaminophen 325 mG 2 Tablet(s) Oral every 6 hours PRN Moderate Pain (4 - 6)  polyethylene glycol 3350 17 Gram(s) Oral daily PRN Constipation

## 2017-11-30 NOTE — CONSULT NOTE ADULT - SUBJECTIVE AND OBJECTIVE BOX
ROSARIO VILLA  [id number]  62y, Male    Calculus of kidney      Patient is a 62y old  Male who presents with a chief complaint of Right Flank pain (2017 01:08)      HPI:  62 year old male with PMH of renal stone 7 years prior, presenting to the ER with right sided flank pain. Patient has experienced 1 week history of flank pain, was sudden onset with radiation to right groin. Patient characterized pain as colicky, 8/10 in intensity and was getting progressively worse. Patient was seen in ER at [facility] 1 day prior for similar complaints. Patient was found to have 3mm right sided uretral stone with mild hydronephrosis. Pain was alleviated with Toradol, and was discharged with percocet. Once home patient started re-experiencing pain and was using Pain medication every 3 hours until finally he decided to return to the ER.   Denies fever, chills, hematuria, any passage of stones and states the symptoms are similar to 7 years prior. (2017 01:08)    Patient had a stone about 7 years ago. ON  he developed 10/10 right sided flank pain radiating to the right groin. No fevers or chills or hematuria. Was in the ER twice and then on this occasion for a total of three times.  Had a normal renal ultrasound, and CT as below.   Allergies    No Known Allergies    Intolerances        MEDICATIONS  (STANDING):  enoxaparin Injectable 40 milliGRAM(s) SubCutaneous every 24 hours  famotidine    Tablet 20 milliGRAM(s) Oral daily  influenza   Vaccine 0.5 milliLiter(s) IntraMuscular once  multiple electrolytes Injection Type 1 1000 milliLiter(s) (150 mL/Hr) IV Continuous <Continuous>  tamsulosin 0.4 milliGRAM(s) Oral at bedtime    MEDICATIONS  (PRN):  morphine  - Injectable 2 milliGRAM(s) IV Push every 6 hours PRN Severe Pain (7 - 10)  oxyCODONE    5 mG/acetaminophen 325 mG 2 Tablet(s) Oral every 6 hours PRN Moderate Pain (4 - 6)  polyethylene glycol 3350 17 Gram(s) Oral daily PRN Constipation      PAST MEDICAL & SURGICAL HISTORY:  Kidney stones  H/O inguinal hernia repair  H/O hand surgery      REVIEW OF SYSTEMS      General: no weight loss	    Skin/Breast: no rashes  	  Ophthalmologic: no eye pain or blurred vision  	  ENMT:	no nasal discharge    Respiratory and Thorax: no wheezing or SOB  	  Cardiovascular:	no palpitations    Gastrointestinal:	no diarrhea    Genitourinary:	as above in HPI    Musculoskeletal:	 no joint stiffness    Neurological: no seizures    Psychiatric: good memory	    Hematology/Lymphatics:	 no easy bruising    Endocrine:	no heat or cold intolerance    Tob:     none                         EtOH: none    I&O's Detail      PE:    Vital Signs Last 24 Hrs  T(C): 37.4 (2017 07:11), Max: 37.4 (2017 04:45)  T(F): 99.3 (2017 07:11), Max: 99.4 (2017 04:45)  HR: 95 (2017 07:11) (88 - 95)  BP: 162/92 (2017 07:11) (134/86 - 162/92)  BP(mean): --  RR: 18 (2017 07:11) (17 - 18)  SpO2: 97% (2017 07:11) (96% - 100%)    Daily Height in cm: 185.42 (2017 15:04)    Daily     PHYSICAL EXAM:      Constitutional: moderate distress    Eyes: no jaundice    ENMT: normal pinnae    Neck: no thryomegaly    Breasts: not examined    Back: mild right CVA tenderness    Respiratory: no accessory muscle use    Cardiovascular: good cap refill    Gastrointestinal: no abdominal distention    Genitourinary: no bladder tenderness    Rectal: not performed    Extremities: no C/C/E    Vascular: normal peripheral pulses    Neurological: no tremors    Skin: no rash    Lymph Nodes: no cervical adenopathy    Musculoskeletal: Good strength    Psychiatric: normal mood and affect        Labs:                          14.2   6.4   )-----------( 157      ( 2017 07:13 )             39.9           138  |  100  |  13.0  ----------------------------<  106  4.7   |  22.0  |  1.74<H>    Ca    8.8      2017 07:13  Phos  2.6     -  Mg     2.2         TPro  6.7  /  Alb  3.5  /  TBili  0.9  /  DBili  x   /  AST  16  /  ALT  13  /  AlkPhos  53  -30      Urinalysis Basic - ( 2017 19:13 )    Color: Yellow / Appearance: Clear / S.015 / pH: x  Gluc: x / Ketone: Negative  / Bili: Negative / Urobili: Negative mg/dL   Blood: x / Protein: Negative mg/dL / Nitrite: Negative   Leuk Esterase: Negative / RBC: x / WBC x   Sq Epi: x / Non Sq Epi: x / Bacteria: x      < from: CT Renal Stone Hunt (17 @ 17:34) >  IMPRESSION:   Obstructing distal right ureteral 3 mm stone causing   moderate right hydroureteronephrosis with perinephric and periureteric   stranding as described.  Atypical a buckle third segment of duodenal sweep without proximal   obstruction.  Bilateral renal calyceal nonobstructing 1-2 mm stones.    Left inguinal hernia.    < end of copied text >  Images of above reviewed as well.     Impression:  renal colic  distal right ureteral stone      Plan:    hydration  tamsulosin  pain meds  hold on toradol due to elevated creatinine  if not able to get adequate pain control or pass stone brittni couple of days will plan on ureteroscopy  discussed with patient    Migel Saunders MD  17 @ 09:37

## 2017-11-30 NOTE — H&P ADULT - NSHPLABSRESULTS_GEN_ALL_CORE
14.6   8.2   )-----------( 193      ( 29 Nov 2017 18:43 )             45.1       29 Nov 2017 18:43    140    |  100    |  15.0   ----------------------------<  113    4.6     |  26.0   |  1.98     Ca    9.9        29 Nov 2017 18:43    TPro  7.7    /  Alb  4.3    /  TBili  0.4    /  DBili  x      /  AST  22     /  ALT  20     /  AlkPhos  68     28 Nov 2017 04:49       EXAM:  CT RENAL STONE HUN                          PROCEDURE DATE:  11/29/2017          INTERPRETATION:  CT renal stone   (CT of the abdomen and pelvis without   contrast)      CLINICAL INFORMATION:      Renal colic.    TECHNIQUE:  Contiguous axial 5 mm sections were obtained using single   helical acquisition through the abdomen and pelvis and were reconstructed   as axial 5 mm sections.  Higher resolution axial and coronal images were   reconstructed through  the kidneys.   Oral and intravenous contrast were   withheld for this indication.      FINDINGS:   No previous examinations are available for review.  There is an obstructing distal right ureteral stone just proximal to the   UV junction measuring 3 mm a causing a moderate right   hydroureteronephrosis with perinephric and periureteric stranding, of.   There are bilateral lung 1-2 mm in nonobstructed calyceal stones within   the right lower pole calyx in the left midpole calyx. Left kidney and   collecting system are otherwise normal. The  A bladder is collapsed.  Prostate and seminal vesicles within normal limits..    The lung bases are clear.         The liver demonstrates homogeneous attenuation with no focal lesion,   allowing for the noncontrast technique.  Hepatic size and contours are   maintained.  Hepatic and portal veins are not displaced.  No intrahepatic   or common ductal dilatation is recognized.  The gallbladder demonstrates   no calcified calculi or wall thickening.  The pancreas is intact without   ductal dilatation or focal lesion.  The spleen is normal in size.    No adrenal masses are found.        No enlarged lymph nodes are found.  No ascites is present.  The osseous   structures are intact.    The bowel shows nonobstructive is of buckled third segment of duodenal   sweep adjacent to aorta and IVC without proximal of stomach distention.   Remaining small bowel unremarkable. Large bowel shows moderate stool   within the right colon with distention of cecum measuring 7.5 cm of. .    No obstruction, perforation or abscess is recognized.       There is a left inguinal hernia containing omental fat and fluid..    IMPRESSION:   Obstructing distal right ureteral 3 mm stone causing   moderate right hydroureteronephrosis with perinephric and periureteric   stranding as described.  Atypical a buckle third segment of duodenal sweep without proximal   obstruction.  Bilateral renal calyceal nonobstructing 1-2 mm stones.    Left inguinal hernia.                  AVA BRIZUELA M.D., ATTENDING RADIOLOGIST  This document has been electronically signed. Nov 29 2017  6:39PM

## 2017-12-01 LAB
ANION GAP SERPL CALC-SCNC: 16 MMOL/L — SIGNIFICANT CHANGE UP (ref 5–17)
BUN SERPL-MCNC: 16 MG/DL — SIGNIFICANT CHANGE UP (ref 8–20)
CALCIUM SERPL-MCNC: 8.6 MG/DL — SIGNIFICANT CHANGE UP (ref 8.6–10.2)
CHLORIDE SERPL-SCNC: 94 MMOL/L — LOW (ref 98–107)
CO2 SERPL-SCNC: 23 MMOL/L — SIGNIFICANT CHANGE UP (ref 22–29)
CREAT SERPL-MCNC: 1.81 MG/DL — HIGH (ref 0.5–1.3)
GLUCOSE SERPL-MCNC: 99 MG/DL — SIGNIFICANT CHANGE UP (ref 70–115)
HCT VFR BLD CALC: 38 % — LOW (ref 42–52)
HGB BLD-MCNC: 13.2 G/DL — LOW (ref 14–18)
MCHC RBC-ENTMCNC: 29.5 PG — SIGNIFICANT CHANGE UP (ref 27–31)
MCHC RBC-ENTMCNC: 34.7 G/DL — SIGNIFICANT CHANGE UP (ref 32–36)
MCV RBC AUTO: 85 FL — SIGNIFICANT CHANGE UP (ref 80–94)
PLATELET # BLD AUTO: 196 K/UL — SIGNIFICANT CHANGE UP (ref 150–400)
POTASSIUM SERPL-MCNC: 4.5 MMOL/L — SIGNIFICANT CHANGE UP (ref 3.5–5.3)
POTASSIUM SERPL-SCNC: 4.5 MMOL/L — SIGNIFICANT CHANGE UP (ref 3.5–5.3)
RBC # BLD: 4.47 M/UL — LOW (ref 4.6–6.2)
RBC # FLD: 12.8 % — SIGNIFICANT CHANGE UP (ref 11–15.6)
SODIUM SERPL-SCNC: 133 MMOL/L — LOW (ref 135–145)
WBC # BLD: 12.1 K/UL — HIGH (ref 4.8–10.8)
WBC # FLD AUTO: 12.1 K/UL — HIGH (ref 4.8–10.8)

## 2017-12-01 PROCEDURE — 52351 CYSTOURETERO & OR PYELOSCOPE: CPT

## 2017-12-01 PROCEDURE — 99233 SBSQ HOSP IP/OBS HIGH 50: CPT | Mod: GC

## 2017-12-01 PROCEDURE — 99232 SBSQ HOSP IP/OBS MODERATE 35: CPT | Mod: 25

## 2017-12-01 RX ORDER — CEPHALEXIN 500 MG
500 CAPSULE ORAL
Qty: 0 | Refills: 0 | Status: DISCONTINUED | OUTPATIENT
Start: 2017-12-01 | End: 2017-12-04

## 2017-12-01 RX ORDER — FENTANYL CITRATE 50 UG/ML
50 INJECTION INTRAVENOUS
Qty: 0 | Refills: 0 | Status: DISCONTINUED | OUTPATIENT
Start: 2017-12-01 | End: 2017-12-01

## 2017-12-01 RX ORDER — ENOXAPARIN SODIUM 100 MG/ML
30 INJECTION SUBCUTANEOUS DAILY
Qty: 0 | Refills: 0 | Status: DISCONTINUED | OUTPATIENT
Start: 2017-12-01 | End: 2017-12-04

## 2017-12-01 RX ORDER — CEFAZOLIN SODIUM 1 G
VIAL (EA) INJECTION
Qty: 0 | Refills: 0 | Status: DISCONTINUED | OUTPATIENT
Start: 2017-12-01 | End: 2017-12-01

## 2017-12-01 RX ORDER — ONDANSETRON 8 MG/1
4 TABLET, FILM COATED ORAL ONCE
Qty: 0 | Refills: 0 | Status: DISCONTINUED | OUTPATIENT
Start: 2017-12-01 | End: 2017-12-01

## 2017-12-01 RX ORDER — OXYCODONE AND ACETAMINOPHEN 5; 325 MG/1; MG/1
2 TABLET ORAL EVERY 6 HOURS
Qty: 0 | Refills: 0 | Status: DISCONTINUED | OUTPATIENT
Start: 2017-12-01 | End: 2017-12-02

## 2017-12-01 RX ORDER — SODIUM CHLORIDE 9 MG/ML
1000 INJECTION, SOLUTION INTRAVENOUS
Qty: 0 | Refills: 0 | Status: DISCONTINUED | OUTPATIENT
Start: 2017-12-01 | End: 2017-12-01

## 2017-12-01 RX ORDER — CEFAZOLIN SODIUM 1 G
1000 VIAL (EA) INJECTION EVERY 8 HOURS
Qty: 0 | Refills: 0 | Status: DISCONTINUED | OUTPATIENT
Start: 2017-12-01 | End: 2017-12-01

## 2017-12-01 RX ORDER — MORPHINE SULFATE 50 MG/1
2 CAPSULE, EXTENDED RELEASE ORAL ONCE
Qty: 0 | Refills: 0 | Status: DISCONTINUED | OUTPATIENT
Start: 2017-12-01 | End: 2017-12-01

## 2017-12-01 RX ORDER — CEFAZOLIN SODIUM 1 G
2000 VIAL (EA) INJECTION ONCE
Qty: 0 | Refills: 0 | Status: DISCONTINUED | OUTPATIENT
Start: 2017-12-01 | End: 2017-12-01

## 2017-12-01 RX ADMIN — MORPHINE SULFATE 2 MILLIGRAM(S): 50 CAPSULE, EXTENDED RELEASE ORAL at 08:14

## 2017-12-01 RX ADMIN — OXYCODONE AND ACETAMINOPHEN 2 TABLET(S): 5; 325 TABLET ORAL at 05:00

## 2017-12-01 RX ADMIN — OXYCODONE AND ACETAMINOPHEN 2 TABLET(S): 5; 325 TABLET ORAL at 09:59

## 2017-12-01 RX ADMIN — OXYCODONE AND ACETAMINOPHEN 2 TABLET(S): 5; 325 TABLET ORAL at 10:29

## 2017-12-01 RX ADMIN — SODIUM CHLORIDE 150 MILLILITER(S): 9 INJECTION, SOLUTION INTRAVENOUS at 04:21

## 2017-12-01 RX ADMIN — MORPHINE SULFATE 2 MILLIGRAM(S): 50 CAPSULE, EXTENDED RELEASE ORAL at 01:54

## 2017-12-01 RX ADMIN — OXYCODONE AND ACETAMINOPHEN 2 TABLET(S): 5; 325 TABLET ORAL at 04:19

## 2017-12-01 RX ADMIN — MORPHINE SULFATE 2 MILLIGRAM(S): 50 CAPSULE, EXTENDED RELEASE ORAL at 13:29

## 2017-12-01 RX ADMIN — MORPHINE SULFATE 2 MILLIGRAM(S): 50 CAPSULE, EXTENDED RELEASE ORAL at 14:44

## 2017-12-01 RX ADMIN — MORPHINE SULFATE 2 MILLIGRAM(S): 50 CAPSULE, EXTENDED RELEASE ORAL at 15:14

## 2017-12-01 RX ADMIN — OXYCODONE AND ACETAMINOPHEN 2 TABLET(S): 5; 325 TABLET ORAL at 21:33

## 2017-12-01 RX ADMIN — Medication 500 MILLIGRAM(S): at 23:22

## 2017-12-01 RX ADMIN — SODIUM CHLORIDE 125 MILLILITER(S): 9 INJECTION, SOLUTION INTRAVENOUS at 22:50

## 2017-12-01 RX ADMIN — FENTANYL CITRATE 50 MICROGRAM(S): 50 INJECTION INTRAVENOUS at 20:54

## 2017-12-01 RX ADMIN — OXYCODONE AND ACETAMINOPHEN 2 TABLET(S): 5; 325 TABLET ORAL at 22:04

## 2017-12-01 RX ADMIN — OXYCODONE AND ACETAMINOPHEN 2 TABLET(S): 5; 325 TABLET ORAL at 15:32

## 2017-12-01 RX ADMIN — MORPHINE SULFATE 2 MILLIGRAM(S): 50 CAPSULE, EXTENDED RELEASE ORAL at 02:20

## 2017-12-01 RX ADMIN — SODIUM CHLORIDE 150 MILLILITER(S): 9 INJECTION, SOLUTION INTRAVENOUS at 13:30

## 2017-12-01 RX ADMIN — OXYCODONE AND ACETAMINOPHEN 2 TABLET(S): 5; 325 TABLET ORAL at 16:02

## 2017-12-01 RX ADMIN — MORPHINE SULFATE 2 MILLIGRAM(S): 50 CAPSULE, EXTENDED RELEASE ORAL at 08:44

## 2017-12-01 RX ADMIN — MORPHINE SULFATE 2 MILLIGRAM(S): 50 CAPSULE, EXTENDED RELEASE ORAL at 13:59

## 2017-12-01 RX ADMIN — FENTANYL CITRATE 50 MICROGRAM(S): 50 INJECTION INTRAVENOUS at 20:38

## 2017-12-01 NOTE — BRIEF OPERATIVE NOTE - POST-OP DX
Renal colic  12/01/2017    Active  Migel Saunders  Right ureteral stone  12/01/2017    Active  Migel Saunders

## 2017-12-01 NOTE — BRIEF OPERATIVE NOTE - PROCEDURE
<<-----Click on this checkbox to enter Procedure Ureteral stent placement, intraoperatively (not at tx)  12/01/2017    Active  [email or web address]  Right ureteroscopy  12/01/2017    Active  [email or web address]

## 2017-12-01 NOTE — PROGRESS NOTE ADULT - SUBJECTIVE AND OBJECTIVE BOX
ROSARIO VILLA  [id number]  62y, Male    Calculus of kidney      Patient is a 62y old  Male who presents with a chief complaint of Right Flank pain (2017 01:08)      HPI:  62 year old male with PMH of renal stone 7 years prior, presenting to the ER with right sided flank pain. Patient has experienced 1 week history of flank pain, was sudden onset with radiation to right groin. Patient characterized pain as colicky, 8/10 in intensity and was getting progressively worse. Patient was seen in ER at [facility] 1 day prior for similar complaints. Patient was found to have 3mm right sided uretral stone with mild hydronephrosis. Pain was alleviated with Toradol, and was discharged with percocet. Once home patient started re-experiencing pain and was using Pain medication every 3 hours until finally he decided to return to the ER.   Denies fever, chills, hematuria, any passage of stones and states the symptoms are similar to 7 years prior. (2017 01:08)    Patient has been having feelings as if the stone will pass but he has been unable to pass it. no dysuria or hematuria. no nausea or vomiting.     Allergies    No Known Allergies    Intolerances        MEDICATIONS  (STANDING):  enoxaparin Injectable 40 milliGRAM(s) SubCutaneous every 24 hours  famotidine    Tablet 20 milliGRAM(s) Oral daily  influenza   Vaccine 0.5 milliLiter(s) IntraMuscular once  morphine  - Injectable 2 milliGRAM(s) IV Push once  multiple electrolytes Injection Type 1 1000 milliLiter(s) (150 mL/Hr) IV Continuous <Continuous>  tamsulosin 0.4 milliGRAM(s) Oral at bedtime    MEDICATIONS  (PRN):  morphine  - Injectable 2 milliGRAM(s) IV Push every 6 hours PRN Severe Pain (7 - 10)  oxyCODONE    5 mG/acetaminophen 325 mG 2 Tablet(s) Oral every 6 hours PRN Moderate Pain (4 - 6)  polyethylene glycol 3350 17 Gram(s) Oral daily PRN Constipation      PAST MEDICAL & SURGICAL HISTORY:  Kidney stones  H/O inguinal hernia repair  H/O hand surgery      I&O's Detail    2017 07:01  -  01 Dec 2017 07:00  --------------------------------------------------------  IN:    multiple electrolytes Injection Type 1: 1650 mL  Total IN: 1650 mL    OUT:    Voided: 1440 mL  Total OUT: 1440 mL    Total NET: 210 mL          PE:    Vital Signs Last 24 Hrs  T(C): 37.2 (01 Dec 2017 05:00), Max: 37.2 (01 Dec 2017 05:00)  T(F): 99 (01 Dec 2017 05:00), Max: 99 (01 Dec 2017 05:00)  HR: 96 (01 Dec 2017 05:00) (85 - 101)  BP: 136/80 (01 Dec 2017 05:00) (136/80 - 155/93)  BP(mean): --  RR: 18 (01 Dec 2017 05:00) (18 - 20)  SpO2: 99% (2017 20:30) (99% - 99%)    Daily     Daily     PHYSICAL EXAM:      Constitutional: in moderate distress    Eyes: no jaundice    ENMT:normal nares    Neck: good ROM    Respiratory: no respiratory distress    Cardiovascular: pink skin    Gastrointestinal: no abdominal distention    Genitourinary: urine clear    Extremities: no C/C/E    Vascular: good cap refill    Neurological: no tremor    Skin: no rash    alert and oriented X 3    Labs:                          13.2   12.1  )-----------( 196      ( 01 Dec 2017 06:03 )             38.0       12-01    133<L>  |  94<L>  |  16.0  ----------------------------<  99  4.5   |  23.0  |  1.81<H>    Ca    8.6      01 Dec 2017 06:03  Phos  2.6     11-30  Mg     2.2         TPro  6.7  /  Alb  3.5  /  TBili  0.9  /  DBili  x   /  AST  16  /  ALT  13  /  AlkPhos  53  30      Urinalysis Basic - ( 2017 19:13 )    Color: Yellow / Appearance: Clear / S.015 / pH: x  Gluc: x / Ketone: Negative  / Bili: Negative / Urobili: Negative mg/dL   Blood: x / Protein: Negative mg/dL / Nitrite: Negative   Leuk Esterase: Negative / RBC: x / WBC x   Sq Epi: x / Non Sq Epi: x / Bacteria: x        Impression:  renal colic  distal right ureteral stone      Plan:  right ureteroscopy with basket, laser if necessary and placement of a ureteral stent  I have discussed the risks, benefits and alternatives with the patient including but not limited to bleeding, infection, ureteral ro urethral or bladder injury, cardiopulmonary complications such as death, pneumonia, arrhythmia. The patient does understand that the stone may propagate proximally making it necessary to just place a stent.  He understands he will receive antibiotics preoperatively.  I will also obtain a KUB prior to surgery. He agrees to proceed.     45 minutes spent discussing this with patient >50% of which was counseling    Migel Saunders MD  17 @ 09:54

## 2017-12-01 NOTE — PROVIDER CONTACT NOTE (MEDICATION) - SITUATION
Patient noted with 3mm kidney stone at this time. Attemepting to wait for kidenny stone to pass. Morphine 2 mg mot effective at this time

## 2017-12-01 NOTE — PROGRESS NOTE ADULT - ASSESSMENT
62 year old male with PMH of renal stones 7 year prior with 3mm right obstructing calculus and intractable pain. Urology to take patient for possible uteroscopy this AM. 62 year old male with PMH of renal stones 7 year prior with 3mm right obstructing calculus and intractable pain. Urology to take patient for possible uteroscopy this AM as patient does not feel like he passe the stone.

## 2017-12-01 NOTE — PROGRESS NOTE ADULT - PROBLEM SELECTOR PLAN 4
Cr 1.81 this AM slight improvement since 11/28/17  Likely related to Toradol use and obstruction.   Will continue with hydration, Avoid NSAID use.   Continue to monitor.

## 2017-12-02 ENCOUNTER — TRANSCRIPTION ENCOUNTER (OUTPATIENT)
Age: 62
End: 2017-12-02

## 2017-12-02 DIAGNOSIS — K59.00 CONSTIPATION, UNSPECIFIED: ICD-10-CM

## 2017-12-02 LAB
ANION GAP SERPL CALC-SCNC: 15 MMOL/L — SIGNIFICANT CHANGE UP (ref 5–17)
BASOPHILS # BLD AUTO: 0 K/UL — SIGNIFICANT CHANGE UP (ref 0–0.2)
BASOPHILS NFR BLD AUTO: 0.1 % — SIGNIFICANT CHANGE UP (ref 0–2)
BUN SERPL-MCNC: 18 MG/DL — SIGNIFICANT CHANGE UP (ref 8–20)
CALCIUM SERPL-MCNC: 8.3 MG/DL — LOW (ref 8.6–10.2)
CHLORIDE SERPL-SCNC: 96 MMOL/L — LOW (ref 98–107)
CO2 SERPL-SCNC: 23 MMOL/L — SIGNIFICANT CHANGE UP (ref 22–29)
CREAT SERPL-MCNC: 2.02 MG/DL — HIGH (ref 0.5–1.3)
EOSINOPHIL # BLD AUTO: 0 K/UL — SIGNIFICANT CHANGE UP (ref 0–0.5)
EOSINOPHIL NFR BLD AUTO: 0.5 % — SIGNIFICANT CHANGE UP (ref 0–5)
GLUCOSE SERPL-MCNC: 124 MG/DL — HIGH (ref 70–115)
HCT VFR BLD CALC: 36.8 % — LOW (ref 42–52)
HGB BLD-MCNC: 12.5 G/DL — LOW (ref 14–18)
LYMPHOCYTES # BLD AUTO: 0.8 K/UL — LOW (ref 1–4.8)
LYMPHOCYTES # BLD AUTO: 8.1 % — LOW (ref 20–55)
MCHC RBC-ENTMCNC: 29.2 PG — SIGNIFICANT CHANGE UP (ref 27–31)
MCHC RBC-ENTMCNC: 34 G/DL — SIGNIFICANT CHANGE UP (ref 32–36)
MCV RBC AUTO: 86 FL — SIGNIFICANT CHANGE UP (ref 80–94)
MONOCYTES # BLD AUTO: 1.3 K/UL — HIGH (ref 0–0.8)
MONOCYTES NFR BLD AUTO: 12.4 % — HIGH (ref 3–10)
NEUTROPHILS # BLD AUTO: 8 K/UL — SIGNIFICANT CHANGE UP (ref 1.8–8)
NEUTROPHILS NFR BLD AUTO: 78.6 % — HIGH (ref 37–73)
PLATELET # BLD AUTO: 205 K/UL — SIGNIFICANT CHANGE UP (ref 150–400)
POTASSIUM SERPL-MCNC: 4.8 MMOL/L — SIGNIFICANT CHANGE UP (ref 3.5–5.3)
POTASSIUM SERPL-SCNC: 4.8 MMOL/L — SIGNIFICANT CHANGE UP (ref 3.5–5.3)
RBC # BLD: 4.28 M/UL — LOW (ref 4.6–6.2)
RBC # FLD: 13.1 % — SIGNIFICANT CHANGE UP (ref 11–15.6)
SODIUM SERPL-SCNC: 134 MMOL/L — LOW (ref 135–145)
WBC # BLD: 10.1 K/UL — SIGNIFICANT CHANGE UP (ref 4.8–10.8)
WBC # FLD AUTO: 10.1 K/UL — SIGNIFICANT CHANGE UP (ref 4.8–10.8)

## 2017-12-02 PROCEDURE — 99231 SBSQ HOSP IP/OBS SF/LOW 25: CPT

## 2017-12-02 PROCEDURE — 99233 SBSQ HOSP IP/OBS HIGH 50: CPT | Mod: GC

## 2017-12-02 RX ORDER — MORPHINE SULFATE 50 MG/1
1 CAPSULE, EXTENDED RELEASE ORAL
Qty: 0 | Refills: 0 | Status: DISCONTINUED | OUTPATIENT
Start: 2017-12-02 | End: 2017-12-04

## 2017-12-02 RX ORDER — OXYCODONE AND ACETAMINOPHEN 5; 325 MG/1; MG/1
1 TABLET ORAL EVERY 4 HOURS
Qty: 0 | Refills: 0 | Status: DISCONTINUED | OUTPATIENT
Start: 2017-12-02 | End: 2017-12-04

## 2017-12-02 RX ORDER — SENNA PLUS 8.6 MG/1
1 TABLET ORAL DAILY
Qty: 0 | Refills: 0 | Status: DISCONTINUED | OUTPATIENT
Start: 2017-12-02 | End: 2017-12-04

## 2017-12-02 RX ORDER — FAMOTIDINE 10 MG/ML
20 INJECTION INTRAVENOUS DAILY
Qty: 0 | Refills: 0 | Status: DISCONTINUED | OUTPATIENT
Start: 2017-12-02 | End: 2017-12-04

## 2017-12-02 RX ORDER — SODIUM CHLORIDE 9 MG/ML
1000 INJECTION INTRAMUSCULAR; INTRAVENOUS; SUBCUTANEOUS
Qty: 0 | Refills: 0 | Status: DISCONTINUED | OUTPATIENT
Start: 2017-12-02 | End: 2017-12-03

## 2017-12-02 RX ORDER — MORPHINE SULFATE 50 MG/1
2 CAPSULE, EXTENDED RELEASE ORAL ONCE
Qty: 0 | Refills: 0 | Status: DISCONTINUED | OUTPATIENT
Start: 2017-12-02 | End: 2017-12-02

## 2017-12-02 RX ORDER — POLYETHYLENE GLYCOL 3350 17 G/17G
17 POWDER, FOR SOLUTION ORAL DAILY
Qty: 0 | Refills: 0 | Status: DISCONTINUED | OUTPATIENT
Start: 2017-12-02 | End: 2017-12-04

## 2017-12-02 RX ORDER — CYCLOBENZAPRINE HYDROCHLORIDE 10 MG/1
5 TABLET, FILM COATED ORAL THREE TIMES A DAY
Qty: 0 | Refills: 0 | Status: COMPLETED | OUTPATIENT
Start: 2017-12-02 | End: 2017-12-03

## 2017-12-02 RX ORDER — DOCUSATE SODIUM 100 MG
100 CAPSULE ORAL
Qty: 0 | Refills: 0 | Status: DISCONTINUED | OUTPATIENT
Start: 2017-12-02 | End: 2017-12-04

## 2017-12-02 RX ORDER — TAMSULOSIN HYDROCHLORIDE 0.4 MG/1
0.4 CAPSULE ORAL AT BEDTIME
Qty: 0 | Refills: 0 | Status: DISCONTINUED | OUTPATIENT
Start: 2017-12-02 | End: 2017-12-04

## 2017-12-02 RX ADMIN — CYCLOBENZAPRINE HYDROCHLORIDE 5 MILLIGRAM(S): 10 TABLET, FILM COATED ORAL at 03:56

## 2017-12-02 RX ADMIN — OXYCODONE AND ACETAMINOPHEN 2 TABLET(S): 5; 325 TABLET ORAL at 05:24

## 2017-12-02 RX ADMIN — Medication 500 MILLIGRAM(S): at 22:27

## 2017-12-02 RX ADMIN — OXYCODONE AND ACETAMINOPHEN 1 TABLET(S): 5; 325 TABLET ORAL at 18:15

## 2017-12-02 RX ADMIN — POLYETHYLENE GLYCOL 3350 17 GRAM(S): 17 POWDER, FOR SOLUTION ORAL at 17:33

## 2017-12-02 RX ADMIN — OXYCODONE AND ACETAMINOPHEN 2 TABLET(S): 5; 325 TABLET ORAL at 12:30

## 2017-12-02 RX ADMIN — MORPHINE SULFATE 2 MILLIGRAM(S): 50 CAPSULE, EXTENDED RELEASE ORAL at 10:29

## 2017-12-02 RX ADMIN — TAMSULOSIN HYDROCHLORIDE 0.4 MILLIGRAM(S): 0.4 CAPSULE ORAL at 22:27

## 2017-12-02 RX ADMIN — OXYCODONE AND ACETAMINOPHEN 1 TABLET(S): 5; 325 TABLET ORAL at 23:00

## 2017-12-02 RX ADMIN — OXYCODONE AND ACETAMINOPHEN 2 TABLET(S): 5; 325 TABLET ORAL at 11:43

## 2017-12-02 RX ADMIN — OXYCODONE AND ACETAMINOPHEN 1 TABLET(S): 5; 325 TABLET ORAL at 22:27

## 2017-12-02 RX ADMIN — SODIUM CHLORIDE 125 MILLILITER(S): 9 INJECTION INTRAMUSCULAR; INTRAVENOUS; SUBCUTANEOUS at 10:30

## 2017-12-02 RX ADMIN — Medication 500 MILLIGRAM(S): at 11:43

## 2017-12-02 RX ADMIN — MORPHINE SULFATE 1 MILLIGRAM(S): 50 CAPSULE, EXTENDED RELEASE ORAL at 22:28

## 2017-12-02 RX ADMIN — CYCLOBENZAPRINE HYDROCHLORIDE 5 MILLIGRAM(S): 10 TABLET, FILM COATED ORAL at 11:45

## 2017-12-02 RX ADMIN — MORPHINE SULFATE 1 MILLIGRAM(S): 50 CAPSULE, EXTENDED RELEASE ORAL at 16:15

## 2017-12-02 RX ADMIN — MORPHINE SULFATE 1 MILLIGRAM(S): 50 CAPSULE, EXTENDED RELEASE ORAL at 15:57

## 2017-12-02 RX ADMIN — MORPHINE SULFATE 2 MILLIGRAM(S): 50 CAPSULE, EXTENDED RELEASE ORAL at 10:45

## 2017-12-02 RX ADMIN — OXYCODONE AND ACETAMINOPHEN 1 TABLET(S): 5; 325 TABLET ORAL at 17:30

## 2017-12-02 RX ADMIN — Medication 100 MILLIGRAM(S): at 17:30

## 2017-12-02 RX ADMIN — OXYCODONE AND ACETAMINOPHEN 2 TABLET(S): 5; 325 TABLET ORAL at 06:30

## 2017-12-02 RX ADMIN — MORPHINE SULFATE 1 MILLIGRAM(S): 50 CAPSULE, EXTENDED RELEASE ORAL at 23:00

## 2017-12-02 RX ADMIN — Medication 500 MILLIGRAM(S): at 17:33

## 2017-12-02 RX ADMIN — Medication 500 MILLIGRAM(S): at 05:24

## 2017-12-02 RX ADMIN — FAMOTIDINE 20 MILLIGRAM(S): 10 INJECTION INTRAVENOUS at 17:30

## 2017-12-02 NOTE — PROGRESS NOTE ADULT - PROBLEM SELECTOR PLAN 4
Possible muscle spasms, patient started on Flexeril 5mg TID  Percocet 5/325mg 2tabs q6h PRN moderate pain Possible muscle spasms, patient started on Flexeril 5mg TID  Percocet 5/325mg 2tabs q4h PRN moderate pain  Morphine 1mg q3 hours for breakthrough pain

## 2017-12-02 NOTE — PROGRESS NOTE ADULT - PROBLEM SELECTOR PLAN 3
Cr >2 this AM, possibly worsened by OR yesterday  Likely related to Toradol use and obstruction.   Will continue with hydration, Avoid NSAID use.   Continue to monitor.

## 2017-12-02 NOTE — PROGRESS NOTE ADULT - SUBJECTIVE AND OBJECTIVE BOX
Patient is a 62y Male who presents with a chief complaint of Right Flank pain (30 Nov 2017 01:08)    Patient seen and examined at bedside. Patient now s/o R uteroscopy and stent placement 12/1/17. No acute overnight events. This AM patient complaining of b/l back pain, feels its muscle spasms. Patient has been voiding, getting OOB to chair, with last BM two days ago. He denies  n/v, fever, chest pain, SOB, abdominal pain, diarrhea, constipation, calf pain.    ROS: +ve for minimal hematuria, otherwise Neg except as above.     Vital Signs Last 24 Hrs  T(C): 37.6 (02 Dec 2017 05:00), Max: 37.7 (01 Dec 2017 21:30)  T(F): 99.7 (02 Dec 2017 05:00), Max: 99.9 (01 Dec 2017 21:30)  HR: 105 (02 Dec 2017 05:00) (90 - 115)  BP: 141/69 (02 Dec 2017 05:00) (110/69 - 154/87)  RR: 18 (02 Dec 2017 05:00) (12 - 20)  SpO2: 94% (01 Dec 2017 21:30) (88% - 100%)    PHYSICAL EXAM: Vital signs reviewed.  General: NAD, sitting comfortably in the chair  HEENT: NCAT, PERRLA, EOMI  Cardiovascular: RRR, +S1/S2, no murmurs  Respiratory: CTAB, no wheeze, rales, rhonchi  Abdomen: +BS, soft, ND, milf suprapubic fullness and tenderness on palpation, no rebound, guarding or rigidity  Back: no CVA tenderness  Extremities: No cyanosis, edema or calf tenderness    Labs:                           12.5   10.1  )-----------( 205      ( 02 Dec 2017 05:11 )             36.8              12-02    134<L>  |  96<L>  |  18.0  ----------------------------<  124<H>  4.8   |  23.0  |  2.02<H>    Ca    8.3<L>      02 Dec 2017 05:11  Phos  2.6     11-30  Mg     2.2     11-30    TPro  6.7  /  Alb  3.5  /  TBili  0.9  /  DBili  x   /  AST  16  /  ALT  13  /  AlkPhos  53  11-30    Radiology: Images reviewed by me.  CT- Renal Vincent  < from: CT Renal Stone Hunt (11.29.17 @ 17:34) >     EXAM:  CT RENAL STONE HUNT                          PROCEDURE DATE:  11/29/2017      IMPRESSION:   Obstructing distal right ureteral 3 mm stone causing   moderate right hydroureteronephrosis with perinephric and periureteric   stranding as described.  Atypical a buckle third segment of duodenal sweep without proximal   obstruction.  Bilateral renal calyceal nonobstructing 1-2 mm stones.    Left inguinal hernia.      MEDICATIONS  (STANDING):  cephalexin 500 milliGRAM(s) Oral four times a day  enoxaparin Injectable 30 milliGRAM(s) SubCutaneous daily  influenza   Vaccine 0.5 milliLiter(s) IntraMuscular once    MEDICATIONS  (PRN):  cyclobenzaprine 5 milliGRAM(s) Oral three times a day PRN Muscle Spasm  oxyCODONE    5 mG/acetaminophen 325 mG 2 Tablet(s) Oral every 6 hours PRN Severe Pain (7 - 10) Patient is a 62y Male who presents with a chief complaint of Right Flank pain (30 Nov 2017 01:08)    Patient seen and examined at bedside. Patient now s/o R uteroscopy and stent placement 12/1/17. No acute overnight events. This AM patient complaining of b/l back pain, feels its muscle spasms. Patient has been voiding, getting OOB to chair, with last BM two days ago. He denies  n/v, fever, chest pain, SOB, abdominal pain, diarrhea, constipation, calf pain.    ROS: +ve for minimal hematuria, otherwise Neg except as above.     Vital Signs Last 24 Hrs  T(C): 37.6 (02 Dec 2017 05:00), Max: 37.7 (01 Dec 2017 21:30)  T(F): 99.7 (02 Dec 2017 05:00), Max: 99.9 (01 Dec 2017 21:30)  HR: 105 (02 Dec 2017 05:00) (90 - 115)  BP: 141/69 (02 Dec 2017 05:00) (110/69 - 154/87)  RR: 18 (02 Dec 2017 05:00) (12 - 20)  SpO2: 94% (01 Dec 2017 21:30) (88% - 100%)    PHYSICAL EXAM: Vital signs reviewed.  General: NAD, sitting comfortably in the chair  HEENT: NCAT, PERRLA, EOMI  Cardiovascular: RRR, +S1/S2, no murmurs  Respiratory: CTAB, no wheeze, rales, rhonchi  Abdomen: +BS, soft, ND, milf suprapubic fullness and tenderness on palpation, no rebound, guarding or rigidity  Back: no CVA tenderness  Extremities: No cyanosis, edema or calf tenderness    Labs:                           12.5   10.1  )-----------( 205      ( 02 Dec 2017 05:11 )             36.8              12-02    134<L>  |  96<L>  |  18.0  ----------------------------<  124<H>  4.8   |  23.0  |  2.02<H>    Ca    8.3<L>      02 Dec 2017 05:11  Phos  2.6     11-30  Mg     2.2     11-30    TPro  6.7  /  Alb  3.5  /  TBili  0.9  /  DBili  x   /  AST  16  /  ALT  13  /  AlkPhos  53  11-30    Radiology: Images reviewed by me.  CT- Renal Vincent  < from: CT Renal Stone Hunt (11.29.17 @ 17:34) >     EXAM:  CT RENAL STONE HUNT                          PROCEDURE DATE:  11/29/2017      IMPRESSION:   Obstructing distal right ureteral 3 mm stone causing   moderate right hydroureteronephrosis with perinephric and periureteric   stranding as described.  Atypical a buckle third segment of duodenal sweep without proximal   obstruction.  Bilateral renal calyceal nonobstructing 1-2 mm stones.    Left inguinal hernia.      MEDICATIONS  (STANDING):  cephalexin 500 milliGRAM(s) Oral four times a day  docusate sodium 100 milliGRAM(s) Oral two times a day  enoxaparin Injectable 30 milliGRAM(s) SubCutaneous daily  famotidine    Tablet 20 milliGRAM(s) Oral daily  polyethylene glycol 3350 17 Gram(s) Oral daily  senna 1 Tablet(s) Oral daily  sodium chloride 0.9%. 1000 milliLiter(s) (125 mL/Hr) IV Continuous <Continuous>  tamsulosin 0.4 milliGRAM(s) Oral at bedtime    MEDICATIONS  (PRN):  cyclobenzaprine 5 milliGRAM(s) Oral three times a day PRN Muscle Spasm  morphine  - Injectable 1 milliGRAM(s) IV Push every 3 hours PRN Breakthrough pain  oxyCODONE    5 mG/acetaminophen 325 mG 1 Tablet(s) Oral every 4 hours PRN Severe Pain (7 - 10)

## 2017-12-02 NOTE — PROGRESS NOTE ADULT - SUBJECTIVE AND OBJECTIVE BOX
ROSARIO VILLA  [id number]  62y, Male    Calculus of kidney      Patient is a 62y old  Male who presents with a chief complaint of Right Flank pain (30 Nov 2017 01:08)      HPI:  62 year old male with PMH of renal stone 7 years prior, presenting to the ER with right sided flank pain. Patient has experienced 1 week history of flank pain, was sudden onset with radiation to right groin. Patient characterized pain as colicky, 8/10 in intensity and was getting progressively worse. Patient was seen in ER at [facility] 1 day prior for similar complaints. Patient was found to have 3mm right sided uretral stone with mild hydronephrosis. Pain was alleviated with Toradol, and was discharged with percocet. Once home patient started re-experiencing pain and was using Pain medication every 3 hours until finally he decided to return to the ER.   Denies fever, chills, hematuria, any passage of stones and states the symptoms are similar to 7 years prior. (30 Nov 2017 01:08)    Feels better.  Had some low back pain last night similar to his typical back pain. Dysuria improving as expected. no frequency or urgency. hematuria improving.   Allergies    No Known Allergies    Intolerances        MEDICATIONS  (STANDING):  cephalexin 500 milliGRAM(s) Oral four times a day  enoxaparin Injectable 30 milliGRAM(s) SubCutaneous daily  influenza   Vaccine 0.5 milliLiter(s) IntraMuscular once  sodium chloride 0.9%. 1000 milliLiter(s) (125 mL/Hr) IV Continuous <Continuous>    MEDICATIONS  (PRN):  cyclobenzaprine 5 milliGRAM(s) Oral three times a day PRN Muscle Spasm  oxyCODONE    5 mG/acetaminophen 325 mG 2 Tablet(s) Oral every 6 hours PRN Severe Pain (7 - 10)      PAST MEDICAL & SURGICAL HISTORY:  Kidney stones  H/O inguinal hernia repair  H/O hand surgery      I&O's Detail    01 Dec 2017 07:01  -  02 Dec 2017 07:00  --------------------------------------------------------  IN:    lactated ringers.: 500 mL  Total IN: 500 mL    OUT:    Voided: 400 mL  Total OUT: 400 mL    Total NET: 100 mL          PE:    Vital Signs Last 24 Hrs  T(C): 37.6 (02 Dec 2017 05:00), Max: 37.7 (01 Dec 2017 21:30)  T(F): 99.7 (02 Dec 2017 05:00), Max: 99.9 (01 Dec 2017 21:30)  HR: 105 (02 Dec 2017 05:00) (90 - 115)  BP: 141/69 (02 Dec 2017 05:00) (110/69 - 154/87)  BP(mean): --  RR: 18 (02 Dec 2017 05:00) (12 - 20)  SpO2: 94% (01 Dec 2017 21:30) (88% - 100%)    Daily     Daily     PHYSICAL EXAM:      Constitutional: no acute distress    Back: mild low back tenderness    Respiratory: no acute distress    Cardiovascular: skin pink    Gastrointestinal: no abdominal distention    Genitourinary: urine intermittently bloody    Extremities:NO C/C/E        Labs:                          12.5   10.1  )-----------( 205      ( 02 Dec 2017 05:11 )             36.8       12-02    134<L>  |  96<L>  |  18.0  ----------------------------<  124<H>  4.8   |  23.0  |  2.02<H>    Ca    8.3<L>      02 Dec 2017 05:11            Impression:  ureteral stone  slightly worsening creatinine, possibly due to previous toradol and ibuprofen use  KUB pending      Plan:  OK with me to send patient home with outpatient creatinine Tuesday and follow up in my office Wednesday.    Migel Saunders MD  12-02-17 @ 09:23

## 2017-12-02 NOTE — PROGRESS NOTE ADULT - ASSESSMENT
62 year old male with PMH of renal stones 7 year prior with 3mm right obstructing calculus and intractable pain. Patient taken for ureteroscopy 12/2 with R ureteral stent placement 62 year old male with PMH of renal stones 7 year prior with 3mm right obstructing calculus and intractable pain. Patient taken for ureteroscopy 12/2 with R ureteral stent placement.

## 2017-12-02 NOTE — PROGRESS NOTE ADULT - PROBLEM SELECTOR PLAN 5
DVT prophylaxis: Lovenox 40mg SQ q24h Colace 100 mg BID, senna PO daily  Miralax daily until BM occurs

## 2017-12-02 NOTE — PROGRESS NOTE ADULT - PROBLEM SELECTOR PLAN 1
Patient s/p R ureteroscopy, with stent placement  Flomax 0.4mg PO qhs  Famotidine 20mg PO daily  IVF at 150cc/hr  Ua unremarkable for infection  Strain urine for calculi  Urology consulted appreciated Patient s/p R ureteroscopy, with R ureteral stent placement  Flomax 0.4mg PO qhs  Famotidine 20mg PO daily  IVF at 125 cc/hr  UA unremarkable for infection  Strain urine for calculi, Daily Intake and Ouput, Weights  Urology consulted appreciated, patient on Cephalexin post-surgery  Pain control with Percocet 2 tabs q4 hours for severe pain, and Morphine q3 hours for Breakthrough pain

## 2017-12-03 DIAGNOSIS — E87.1 HYPO-OSMOLALITY AND HYPONATREMIA: ICD-10-CM

## 2017-12-03 LAB
ANION GAP SERPL CALC-SCNC: 11 MMOL/L — SIGNIFICANT CHANGE UP (ref 5–17)
APPEARANCE UR: CLEAR — SIGNIFICANT CHANGE UP
BACTERIA # UR AUTO: ABNORMAL
BILIRUB UR-MCNC: NEGATIVE — SIGNIFICANT CHANGE UP
BUN SERPL-MCNC: 19 MG/DL — SIGNIFICANT CHANGE UP (ref 8–20)
CALCIUM SERPL-MCNC: 7.9 MG/DL — LOW (ref 8.6–10.2)
CHLORIDE SERPL-SCNC: 99 MMOL/L — SIGNIFICANT CHANGE UP (ref 98–107)
CHLORIDE UR-SCNC: 76 MMOL/L — SIGNIFICANT CHANGE UP
CO2 SERPL-SCNC: 24 MMOL/L — SIGNIFICANT CHANGE UP (ref 22–29)
COLOR SPEC: YELLOW — SIGNIFICANT CHANGE UP
COMMENT - URINE: SIGNIFICANT CHANGE UP
CREAT ?TM UR-MCNC: 96 MG/DL — SIGNIFICANT CHANGE UP
CREAT SERPL-MCNC: 2.01 MG/DL — HIGH (ref 0.5–1.3)
DIFF PNL FLD: ABNORMAL
EPI CELLS # UR: SIGNIFICANT CHANGE UP
GLUCOSE SERPL-MCNC: 113 MG/DL — SIGNIFICANT CHANGE UP (ref 70–115)
GLUCOSE UR QL: NEGATIVE MG/DL — SIGNIFICANT CHANGE UP
KETONES UR-MCNC: NEGATIVE — SIGNIFICANT CHANGE UP
LEUKOCYTE ESTERASE UR-ACNC: ABNORMAL
NITRITE UR-MCNC: NEGATIVE — SIGNIFICANT CHANGE UP
OSMOLALITY UR: 411 MOSM/KG — SIGNIFICANT CHANGE UP (ref 300–1000)
PH UR: 7 — SIGNIFICANT CHANGE UP (ref 5–8)
POTASSIUM SERPL-MCNC: 4.1 MMOL/L — SIGNIFICANT CHANGE UP (ref 3.5–5.3)
POTASSIUM SERPL-SCNC: 4.1 MMOL/L — SIGNIFICANT CHANGE UP (ref 3.5–5.3)
POTASSIUM UR-SCNC: 18 MMOL/L — SIGNIFICANT CHANGE UP
PROT UR-MCNC: 30 MG/DL
RBC CASTS # UR COMP ASSIST: ABNORMAL /HPF (ref 0–4)
SODIUM SERPL-SCNC: 134 MMOL/L — LOW (ref 135–145)
SODIUM UR-SCNC: 92 MMOL/L — SIGNIFICANT CHANGE UP
SP GR SPEC: 1 — LOW (ref 1.01–1.02)
UROBILINOGEN FLD QL: 1 MG/DL
WBC UR QL: ABNORMAL

## 2017-12-03 PROCEDURE — 76775 US EXAM ABDO BACK WALL LIM: CPT | Mod: 26

## 2017-12-03 PROCEDURE — 99233 SBSQ HOSP IP/OBS HIGH 50: CPT | Mod: GC

## 2017-12-03 PROCEDURE — 99231 SBSQ HOSP IP/OBS SF/LOW 25: CPT

## 2017-12-03 PROCEDURE — 76770 US EXAM ABDO BACK WALL COMP: CPT | Mod: 26

## 2017-12-03 RX ORDER — CYCLOBENZAPRINE HYDROCHLORIDE 10 MG/1
5 TABLET, FILM COATED ORAL THREE TIMES A DAY
Qty: 0 | Refills: 0 | Status: DISCONTINUED | OUTPATIENT
Start: 2017-12-03 | End: 2017-12-04

## 2017-12-03 RX ORDER — CALCIUM ACETATE 667 MG
667 TABLET ORAL
Qty: 0 | Refills: 0 | Status: DISCONTINUED | OUTPATIENT
Start: 2017-12-03 | End: 2017-12-04

## 2017-12-03 RX ORDER — SODIUM CHLORIDE 9 MG/ML
1000 INJECTION INTRAMUSCULAR; INTRAVENOUS; SUBCUTANEOUS
Qty: 0 | Refills: 0 | Status: DISCONTINUED | OUTPATIENT
Start: 2017-12-03 | End: 2017-12-04

## 2017-12-03 RX ADMIN — Medication 500 MILLIGRAM(S): at 17:19

## 2017-12-03 RX ADMIN — OXYCODONE AND ACETAMINOPHEN 1 TABLET(S): 5; 325 TABLET ORAL at 23:45

## 2017-12-03 RX ADMIN — Medication 667 MILLIGRAM(S): at 17:19

## 2017-12-03 RX ADMIN — CYCLOBENZAPRINE HYDROCHLORIDE 5 MILLIGRAM(S): 10 TABLET, FILM COATED ORAL at 17:22

## 2017-12-03 RX ADMIN — Medication 667 MILLIGRAM(S): at 23:45

## 2017-12-03 RX ADMIN — OXYCODONE AND ACETAMINOPHEN 1 TABLET(S): 5; 325 TABLET ORAL at 18:51

## 2017-12-03 RX ADMIN — Medication 500 MILLIGRAM(S): at 06:20

## 2017-12-03 RX ADMIN — SODIUM CHLORIDE 80 MILLILITER(S): 9 INJECTION INTRAMUSCULAR; INTRAVENOUS; SUBCUTANEOUS at 12:38

## 2017-12-03 RX ADMIN — Medication 100 MILLIGRAM(S): at 17:19

## 2017-12-03 RX ADMIN — Medication 100 MILLIGRAM(S): at 06:20

## 2017-12-03 RX ADMIN — Medication 500 MILLIGRAM(S): at 12:38

## 2017-12-03 RX ADMIN — OXYCODONE AND ACETAMINOPHEN 1 TABLET(S): 5; 325 TABLET ORAL at 03:18

## 2017-12-03 RX ADMIN — TAMSULOSIN HYDROCHLORIDE 0.4 MILLIGRAM(S): 0.4 CAPSULE ORAL at 23:45

## 2017-12-03 RX ADMIN — FAMOTIDINE 20 MILLIGRAM(S): 10 INJECTION INTRAVENOUS at 12:38

## 2017-12-03 RX ADMIN — OXYCODONE AND ACETAMINOPHEN 1 TABLET(S): 5; 325 TABLET ORAL at 04:00

## 2017-12-03 RX ADMIN — OXYCODONE AND ACETAMINOPHEN 1 TABLET(S): 5; 325 TABLET ORAL at 14:25

## 2017-12-03 RX ADMIN — Medication 500 MILLIGRAM(S): at 23:45

## 2017-12-03 RX ADMIN — OXYCODONE AND ACETAMINOPHEN 1 TABLET(S): 5; 325 TABLET ORAL at 09:33

## 2017-12-03 RX ADMIN — OXYCODONE AND ACETAMINOPHEN 1 TABLET(S): 5; 325 TABLET ORAL at 19:35

## 2017-12-03 RX ADMIN — CYCLOBENZAPRINE HYDROCHLORIDE 5 MILLIGRAM(S): 10 TABLET, FILM COATED ORAL at 03:18

## 2017-12-03 RX ADMIN — OXYCODONE AND ACETAMINOPHEN 1 TABLET(S): 5; 325 TABLET ORAL at 13:38

## 2017-12-03 RX ADMIN — MORPHINE SULFATE 1 MILLIGRAM(S): 50 CAPSULE, EXTENDED RELEASE ORAL at 06:20

## 2017-12-03 RX ADMIN — OXYCODONE AND ACETAMINOPHEN 1 TABLET(S): 5; 325 TABLET ORAL at 10:18

## 2017-12-03 NOTE — PROGRESS NOTE ADULT - PROBLEM SELECTOR PLAN 3
Cr 2.01 this AM. Unknown etiology. Concern for Post-renal failure.  Bladder scan =negative  Urine electrolytes, repeat UA, Renal Sono pending.  Nephro consult requested, Urology notified (rec Renal u/s)

## 2017-12-03 NOTE — PROGRESS NOTE ADULT - ASSESSMENT
62 year old male with PMH of renal stones 7 year prior with 3mm right obstructing calculus and intractable pain. Patient taken for ureteroscopy 12/2 with R ureteral stent placement. Pt has clinically improved with 4-5 rt flank pain and episode of fever on 12/02-03/2017 that responded to tylenol.

## 2017-12-03 NOTE — PROGRESS NOTE ADULT - SUBJECTIVE AND OBJECTIVE BOX
Patient is a 62y old  Male who presents with a chief complaint of Right Flank pain (30 Nov 2017 01:08)      INTERVAL HPI/OVERNIGHT EVENTS:  62y  Male seen and examined at bedside. Pt was sitting up in the chair. Pt has no complaints, reports that his pain has improved, now at 4-5, tolerating po intake but decreased appetite, voiding clear urine, no bowel movement. Pt denies fever, SOB, CP, nausea, vomiting, abd pain, dysuria, urinary freq, LE edema,/pain.    ANTIMICROBIAL:  cephalexin 500 milliGRAM(s) Oral four times a day    CARDIOVASCULAR:  tamsulosin 0.4 milliGRAM(s) Oral at bedtime    PULMONARY:    NEUROLOGIC:  morphine  - Injectable 1 milliGRAM(s) IV Push every 3 hours PRN  oxyCODONE    5 mG/acetaminophen 325 mG 1 Tablet(s) Oral every 4 hours PRN    ONCOLOGIC:    HEMATOLOGIC:  enoxaparin Injectable 30 milliGRAM(s) SubCutaneous daily    GATROINTESTINAL:  docusate sodium 100 milliGRAM(s) Oral two times a day  famotidine    Tablet 20 milliGRAM(s) Oral daily  polyethylene glycol 3350 17 Gram(s) Oral daily  senna 1 Tablet(s) Oral daily     MEDS:    ENDO/METABOLIC:    IV FLUID/NUTRITION:  sodium chloride 0.9%. 1000 milliLiter(s) IV Continuous <Continuous>    TOPICAL:    IMMUNOLOGIC & OTHER      Allergies    No Known Allergies    Intolerances        Vital Signs Last 24 Hrs  T(C): 37.8 (03 Dec 2017 04:49), Max: 38.3 (02 Dec 2017 21:17)  T(F): 100 (03 Dec 2017 04:49), Max: 100.9 (02 Dec 2017 21:17)  HR: 102 (03 Dec 2017 04:49) (101 - 111)  BP: 135/70 (03 Dec 2017 04:49) (132/73 - 147/87)  BP(mean): --  RR: 18 (02 Dec 2017 21:17) (18 - 18)  SpO2: 95% (02 Dec 2017 21:17) (95% - 96%)      Daily     Daily   I&O's Detail    01 Dec 2017 07:01  -  02 Dec 2017 07:00  --------------------------------------------------------  IN:    lactated ringers.: 500 mL  Total IN: 500 mL    OUT:    Voided: 400 mL  Total OUT: 400 mL    Total NET: 100 mL      02 Dec 2017 07:01  -  03 Dec 2017 06:59  --------------------------------------------------------  IN:    Oral Fluid: 480 mL    sodium chloride 0.9%.: 1250 mL  Total IN: 1730 mL    OUT:    Voided: 2125 mL  Total OUT: 2125 mL    Total NET: -395 mL      REVIEW OF SYSTEMS:  Noted above    PHYSICAL EXAM:    GENERAL: NAD, well-groomed, well-developed  HEAD:  Atraumatic, Normocephalic  EYES: EOMI, PERRLA, conjunctiva and sclera clear  ENMT: No tonsillar erythema, exudates, or enlargement; Moist mucous membranes, Good dentition, No lesions  NECK: Supple, No JVD, Normal thyroid  NERVOUS SYSTEM:  Alert & Oriented X3, Good concentration; Motor Strength 5/5 B/L upper and lower extremities; DTRs 2+ intact and symmetric  CHEST/LUNG: Clear to percussion bilaterally; No rales, rhonchi, wheezing, or rubs  HEART: Regular rate and rhythm; No murmurs, rubs, or gallops  ABDOMEN: Soft, Nontender, Nondistended; Bowel sounds present  EXTREMITIES:  2+ Peripheral Pulses, No clubbing, cyanosis, or edema  LYMPH: No lymphadenopathy noted  RECTAL: No masses, prostate normal size and smooth, Guiac negative   BREAST: No palpatble masses, skin no lesions no retractions, no discharages. adenexa no palpable masses noted   GYN: uterus normal size, adenexa no palpable masses, no CMT, no uterine discharge   SKIN: No rashes or lesions      LABS:                        12.5   10.1  )-----------( 205      ( 02 Dec 2017 05:11 )             36.8     CBC Full  -  ( 02 Dec 2017 05:11 )  WBC Count : 10.1 K/uL  Hemoglobin : 12.5 g/dL  Hematocrit : 36.8 %  Platelet Count - Automated : 205 K/uL  Mean Cell Volume : 86.0 fl  Mean Cell Hemoglobin : 29.2 pg  Mean Cell Hemoglobin Concentration : 34.0 g/dL  Auto Neutrophil # : 8.0 K/uL  Auto Lymphocyte # : 0.8 K/uL  Auto Monocyte # : 1.3 K/uL  Auto Eosinophil # : 0.0 K/uL  Auto Basophil # : 0.0 K/uL  Auto Neutrophil % : 78.6 %  Auto Lymphocyte % : 8.1 %  Auto Monocyte % : 12.4 %  Auto Eosinophil % : 0.5 %  Auto Basophil % : 0.1 %    12-02    134<L>  |  96<L>  |  18.0  ----------------------------<  124<H>  4.8   |  23.0  |  2.02<H>    Ca    8.3<L>      02 Dec 2017 05:11                        RADIOLOGY & ADDITIONAL TESTS: Patient is a 62y old  Male who presents with a chief complaint of Right Flank pain (30 Nov 2017 01:08)      INTERVAL HPI/OVERNIGHT EVENTS:  62y  Male seen and examined at bedside. Pt was sitting up in the chair. Pt has no complaints, reports that his right flank pain has improved, now at 4-5 and his back pain has resolved tolerating po intake but decreased appetite, voiding clear urine, no bowel movement. Pt denies fever, chills, SOB, CP, nausea, vomiting, abd pain, dysuria, hematuria, urinary freq, LE edema,/pain.    ANTIMICROBIAL:  cephalexin 500 milliGRAM(s) Oral four times a day    CARDIOVASCULAR:  tamsulosin 0.4 milliGRAM(s) Oral at bedtime    PULMONARY:    NEUROLOGIC:  morphine  - Injectable 1 milliGRAM(s) IV Push every 3 hours PRN  oxyCODONE    5 mG/acetaminophen 325 mG 1 Tablet(s) Oral every 4 hours PRN    ONCOLOGIC:    HEMATOLOGIC:  enoxaparin Injectable 30 milliGRAM(s) SubCutaneous daily    GATROINTESTINAL:  docusate sodium 100 milliGRAM(s) Oral two times a day  famotidine    Tablet 20 milliGRAM(s) Oral daily  polyethylene glycol 3350 17 Gram(s) Oral daily  senna 1 Tablet(s) Oral daily     MEDS:    ENDO/METABOLIC:    IV FLUID/NUTRITION:  sodium chloride 0.9%. 1000 milliLiter(s) IV Continuous <Continuous>    TOPICAL:    IMMUNOLOGIC & OTHER      Allergies    No Known Allergies    Intolerances        Vital Signs Last 24 Hrs  T(C): 37.8 (03 Dec 2017 04:49), Max: 38.3 (02 Dec 2017 21:17)  T(F): 100 (03 Dec 2017 04:49), Max: 100.9 (02 Dec 2017 21:17)  HR: 102 (03 Dec 2017 04:49) (101 - 111)  BP: 135/70 (03 Dec 2017 04:49) (132/73 - 147/87)  BP(mean): --  RR: 18 (02 Dec 2017 21:17) (18 - 18)  SpO2: 95% (02 Dec 2017 21:17) (95% - 96%)      Daily     Daily   I&O's Detail    01 Dec 2017 07:01  -  02 Dec 2017 07:00  --------------------------------------------------------  IN:    lactated ringers.: 500 mL  Total IN: 500 mL    OUT:    Voided: 400 mL  Total OUT: 400 mL    Total NET: 100 mL      02 Dec 2017 07:01  -  03 Dec 2017 06:59  --------------------------------------------------------  IN:    Oral Fluid: 480 mL    sodium chloride 0.9%.: 1250 mL  Total IN: 1730 mL    OUT:    Voided: 2125 mL  Total OUT: 2125 mL    Total NET: -395 mL      REVIEW OF SYSTEMS:  Noted above    PHYSICAL EXAM:    GENERAL: NAD, well-groomed, well-developed  HEAD:  Atraumatic, Normocephalic  EYES: EOMI, PERRLA, conjunctiva and sclera clear  ENMT: No tonsillar erythema, exudates, or enlargement; Moist mucous membranes  NECK: Supple, No JVD, Normal thyroid  NERVOUS SYSTEM:  Alert & Oriented X3, Good concentration; Motor Strength 5/5 B/L upper and lower extremities; DTRs 2+ intact and symmetric  CHEST/LUNG: Clear to percussion bilaterally; No rales, rhonchi, wheezing, or rubs  HEART: Regular rate and rhythm; No murmurs, rubs, or gallops  Back: mild Right flank tenderness  ABDOMEN: Soft, Nontender, Nondistended; Bowel sounds present  EXTREMITIES:  2+ Peripheral Pulses, No clubbing, cyanosis, or edema  SKIN: No rashes or lesions      LABS:                        12.5   10.1  )-----------( 205      ( 02 Dec 2017 05:11 )             36.8     CBC Full  -  ( 02 Dec 2017 05:11 )  WBC Count : 10.1 K/uL  Hemoglobin : 12.5 g/dL  Hematocrit : 36.8 %  Platelet Count - Automated : 205 K/uL  Mean Cell Volume : 86.0 fl  Mean Cell Hemoglobin : 29.2 pg  Mean Cell Hemoglobin Concentration : 34.0 g/dL  Auto Neutrophil # : 8.0 K/uL  Auto Lymphocyte # : 0.8 K/uL  Auto Monocyte # : 1.3 K/uL  Auto Eosinophil # : 0.0 K/uL  Auto Basophil # : 0.0 K/uL  Auto Neutrophil % : 78.6 %  Auto Lymphocyte % : 8.1 %  Auto Monocyte % : 12.4 %  Auto Eosinophil % : 0.5 %  Auto Basophil % : 0.1 %    12-02    134<L>  |  96<L>  |  18.0  ----------------------------<  124<H>  4.8   |  23.0  |  2.02<H>    Ca    8.3<L>      02 Dec 2017 05:11      RADIOLOGY & ADDITIONAL TESTS:

## 2017-12-03 NOTE — PROGRESS NOTE ADULT - PROBLEM SELECTOR PLAN 5
Resolved, responded to Flexeril. Likely 2/2 Possible muscle spasms, patient started on Flexeril 5mg TID  c/w pain management as above

## 2017-12-03 NOTE — CONSULT NOTE ADULT - SUBJECTIVE AND OBJECTIVE BOX
Patient is a 62y old  Male who presents with a chief complaint of Right Flank pain (30 Nov 2017 01:08)   Acute  renal failure.    HPI:  62 year old male with PMH of renal stone 7 years prior, presenting to the ER with right sided flank pain. Patient has experienced 1 week history of flank pain, was sudden onset with radiation to right groin. Patient characterized pain as colicky, 8/10 in intensity and was getting progressively worse. Patient was seen in ER at [facility] 1 day prior for similar complaints. Patient was found to have 3mm right sided uretral stone with mild hydronephrosis. Pain was alleviated with Toradol, and was discharged with percocet. Once home patient started re-experiencing pain and was using Pain medication every 3 hours until finally he decided to return to the ER.   Denies fever, chills, hematuria, any passage of stones and states the symptoms are similar to 7 years prior. (30 Nov 2017 01:08)   Hx renal stones x1 not aware of type, no other renal  problems; elevated creatinine on admission. Pt states creatinine 2 days before first ER  visit his  creatinine was 1.1.  Pt give Hx of bilateral lower back pain going  back to last summer different than recent pain, however he still has that pain that was  helped by flexeril.      PAST MEDICAL & SURGICAL HISTORY:  Kidney stones  H/O inguinal hernia repair  H/O hand surgery       FAMILY HISTORY:  No pertinent family history in first degree relatives  [place]    Social History: Non smoker    MEDICATIONS  (STANDING):  cephalexin 500 milliGRAM(s) Oral four times a day  docusate sodium 100 milliGRAM(s) Oral two times a day  enoxaparin Injectable 30 milliGRAM(s) SubCutaneous daily  famotidine    Tablet 20 milliGRAM(s) Oral daily  polyethylene glycol 3350 17 Gram(s) Oral daily  senna 1 Tablet(s) Oral daily  sodium chloride 0.9%. 1000 milliLiter(s) (80 mL/Hr) IV Continuous <Continuous>  tamsulosin 0.4 milliGRAM(s) Oral at bedtime    MEDICATIONS  (PRN):  cyclobenzaprine 5 milliGRAM(s) Oral three times a day PRN Muscle Spasm  morphine  - Injectable 1 milliGRAM(s) IV Push every 3 hours PRN Breakthrough pain  oxyCODONE    5 mG/acetaminophen 325 mG 1 Tablet(s) Oral every 4 hours PRN Severe Pain (7 - 10)   Meds reviewed    Allergies    No Known Allergies    Intolerances        REVIEW OF SYSTEMS:    CONSTITUTIONAL:  Back pain  EYES: No eye pain, visual disturbances, or discharge  ENMT:  No difficulty hearing, tinnitus, vertigo; No sinus or throat pain  NECK: No pain or stiffness  BREASTS: No pain, masses, or nipple discharge  RESPIRATORY: neg  CARDIOVASCULAR: No chest pain, palpitations, dizziness,   GASTROINTESTINAL: No abdominal or epigastric pain. No nausea, vomiting, or hematemesis; No diarrhea or constipation. No melena or   GENITOURINARY: No dysuria, frequency, hematuria, or incontinence  NEUROLOGICAL: No headaches, memory loss, loss of strength, numbness, or tremors  SKIN: neg  LYMPH NODES: No enlarged glands  ENDOCRINE: No heat or cold intolerance; No hair loss  MUSCULOSKELETAL: back pain  PSYCHIATRIC: No depression, anxiety, mood swings, or difficulty sleeping  HEME/LYMPH: No easy bruising, or bleeding gums  ALLERY AND IMMUNOLOGIC: No hives or eczema      Vital Signs Last 24 Hrs  T(C): 37.8 (03 Dec 2017 04:49), Max: 38.3 (02 Dec 2017 21:17)  T(F): 100 (03 Dec 2017 04:49), Max: 100.9 (02 Dec 2017 21:17)  HR: 102 (03 Dec 2017 04:49) (101 - 111)  BP: 135/70 (03 Dec 2017 04:49) (132/86 - 147/87)  BP(mean): --  RR: 18 (02 Dec 2017 21:17) (18 - 18)  SpO2: 95% (02 Dec 2017 21:17) (95% - 95%)  Daily     Daily     PHYSICAL EXAM:    GENERAL: appears acutely ill, oriented.  HEAD:  Atraumatic, Normocephalic  EYES: EOMI, PERRLA, conjunctiva and sclera clear  ENMT: No tonsillar erythema, exudates, or enlargement; Moist mucous membranes, Good dentition,   NECK: Supple, neck  veins full  NERVOUS SYSTEM:  Alert & Oriented X3, Good concentration; Motor Strength wnl upper and lower extremities;  CHEST/LUNG: Clear to percussion bilaterally; No rales, rhonchi, wheezing, or rubs  HEART: Regular rate and rhythm; No murmurs, rubs, or gallops  ABDOMEN: Soft, Nontender, Nondistended; Bowel sounds present, body wall and flank edema  EXTREMITIES:  +1 leg edema   LYMPH: No lymphadenopathy noted  SKIN: No rashes or lesions, pale   pink urine today    LABS:                        12.5   10.1  )-----------( 205      ( 02 Dec 2017 05:11 )             36.8     12-03    134<L>  |  99  |  19.0  ----------------------------<  113  4.1   |  24.0  |  2.01<H>    Ca    7.9<L>      03 Dec 2017 06:50                  RADIOLOGY & ADDITIONAL TESTS:

## 2017-12-03 NOTE — PROGRESS NOTE ADULT - PROBLEM SELECTOR PLAN 1
Patient s/p R ureteroscopy, with R ureteral stent placement  c/w tamsulosin 0.4 milliGRAM(s) Oral at bedtime  IVF decreased to 80 cc/hr 2/2 concern for fluid overload indicated by hyponatremia  C/w cephalexin 500 milliGRAM(s) Oral four times a day  Pain: morphine  - Injectable 1 milliGRAM(s) IV Push every 3 hours PRN  oxyCODONE    5 mG/acetaminophen 325 mG 1 Tablet(s) Oral every 4 hours PRN  Urology consulted appreciated

## 2017-12-03 NOTE — PROGRESS NOTE ADULT - SUBJECTIVE AND OBJECTIVE BOX
ROSARIO VILLA  [id number]  62y, Male    Calculus of kidney      Patient is a 62y old  Male who presents with a chief complaint of Right Flank pain (30 Nov 2017 01:08)      HPI:  62 year old male with PMH of renal stone 7 years prior, presenting to the ER with right sided flank pain. Patient has experienced 1 week history of flank pain, was sudden onset with radiation to right groin. Patient characterized pain as colicky, 8/10 in intensity and was getting progressively worse. Patient was seen in ER at [facility] 1 day prior for similar complaints. Patient was found to have 3mm right sided uretral stone with mild hydronephrosis. Pain was alleviated with Toradol, and was discharged with percocet. Once home patient started re-experiencing pain and was using Pain medication every 3 hours until finally he decided to return to the ER.   Denies fever, chills, hematuria, any passage of stones and states the symptoms are similar to 7 years prior. (30 Nov 2017 01:08)    patient with pain.  his back pain has resolved. no dysuria or frequency. no feeling of post void fullness. no nausea or vomiting.   Allergies    No Known Allergies    Intolerances        MEDICATIONS  (STANDING):  cephalexin 500 milliGRAM(s) Oral four times a day  docusate sodium 100 milliGRAM(s) Oral two times a day  enoxaparin Injectable 30 milliGRAM(s) SubCutaneous daily  famotidine    Tablet 20 milliGRAM(s) Oral daily  polyethylene glycol 3350 17 Gram(s) Oral daily  senna 1 Tablet(s) Oral daily  sodium chloride 0.9%. 1000 milliLiter(s) (80 mL/Hr) IV Continuous <Continuous>  tamsulosin 0.4 milliGRAM(s) Oral at bedtime    MEDICATIONS  (PRN):  cyclobenzaprine 5 milliGRAM(s) Oral three times a day PRN Muscle Spasm  morphine  - Injectable 1 milliGRAM(s) IV Push every 3 hours PRN Breakthrough pain  oxyCODONE    5 mG/acetaminophen 325 mG 1 Tablet(s) Oral every 4 hours PRN Severe Pain (7 - 10)      PAST MEDICAL & SURGICAL HISTORY:  Kidney stones  H/O inguinal hernia repair  H/O hand surgery      I&O's Detail    02 Dec 2017 07:01  -  03 Dec 2017 07:00  --------------------------------------------------------  IN:    Oral Fluid: 480 mL    sodium chloride 0.9%: 1250 mL  Total IN: 1730 mL    OUT:    Voided: 2125 mL  Total OUT: 2125 mL    Total NET: -395 mL          PE:    Vital Signs Last 24 Hrs  T(C): 37.8 (03 Dec 2017 04:49), Max: 38.3 (02 Dec 2017 21:17)  T(F): 100 (03 Dec 2017 04:49), Max: 100.9 (02 Dec 2017 21:17)  HR: 102 (03 Dec 2017 04:49) (101 - 111)  BP: 135/70 (03 Dec 2017 04:49) (132/86 - 147/87)  BP(mean): --  RR: 18 (02 Dec 2017 21:17) (18 - 18)  SpO2: 95% (02 Dec 2017 21:17) (95% - 95%)    Daily     Daily     PHYSICAL EXAM:      Constitutional: appears in no acute distress.    Eyes: conjugate gaze    Respiratory: no respiratory distress.    Cardiovascular: skin pink    Gastrointestinal: no abdominal distention    Skin: no rash    Lymph Nodes:    Musculoskeletal: no C/C/E    Psychiatric: alert and oriented X 3        Labs:                          12.5   10.1  )-----------( 205      ( 02 Dec 2017 05:11 )             36.8       12-03    134<L>  |  99  |  19.0  ----------------------------<  113  4.1   |  24.0  |  2.01<H>    Ca    7.9<L>      03 Dec 2017 06:50            Impression:  ureteral stone  persistently elevated creat      Plan:  agree with renal ultrasound    Migel Saunders MD  12-03-17 @ 13:09

## 2017-12-04 ENCOUNTER — TRANSCRIPTION ENCOUNTER (OUTPATIENT)
Age: 62
End: 2017-12-04

## 2017-12-04 VITALS — TEMPERATURE: 98 F | SYSTOLIC BLOOD PRESSURE: 126 MMHG | DIASTOLIC BLOOD PRESSURE: 70 MMHG | HEART RATE: 92 BPM

## 2017-12-04 PROBLEM — Z00.00 ENCOUNTER FOR PREVENTIVE HEALTH EXAMINATION: Status: ACTIVE | Noted: 2017-12-04

## 2017-12-04 LAB
ALBUMIN SERPL ELPH-MCNC: 2.9 G/DL — LOW (ref 3.3–5.2)
ALP SERPL-CCNC: 218 U/L — HIGH (ref 40–120)
ALT FLD-CCNC: 22 U/L — SIGNIFICANT CHANGE UP
ANION GAP SERPL CALC-SCNC: 13 MMOL/L — SIGNIFICANT CHANGE UP (ref 5–17)
AST SERPL-CCNC: 28 U/L — SIGNIFICANT CHANGE UP
BASOPHILS # BLD AUTO: 0 K/UL — SIGNIFICANT CHANGE UP (ref 0–0.2)
BASOPHILS NFR BLD AUTO: 0.2 % — SIGNIFICANT CHANGE UP (ref 0–2)
BILIRUB SERPL-MCNC: 0.8 MG/DL — SIGNIFICANT CHANGE UP (ref 0.4–2)
BUN SERPL-MCNC: 18 MG/DL — SIGNIFICANT CHANGE UP (ref 8–20)
CALCIUM SERPL-MCNC: 8.3 MG/DL — LOW (ref 8.4–10.5)
CALCIUM SERPL-MCNC: 8.4 MG/DL — LOW (ref 8.6–10.2)
CHLORIDE SERPL-SCNC: 97 MMOL/L — LOW (ref 98–107)
CO2 SERPL-SCNC: 24 MMOL/L — SIGNIFICANT CHANGE UP (ref 22–29)
CREAT SERPL-MCNC: 1.88 MG/DL — HIGH (ref 0.5–1.3)
EOSINOPHIL # BLD AUTO: 0.2 K/UL — SIGNIFICANT CHANGE UP (ref 0–0.5)
EOSINOPHIL NFR BLD AUTO: 2.5 % — SIGNIFICANT CHANGE UP (ref 0–5)
GLUCOSE SERPL-MCNC: 94 MG/DL — SIGNIFICANT CHANGE UP (ref 70–115)
HCT VFR BLD CALC: 33.8 % — LOW (ref 42–52)
HGB BLD-MCNC: 11.6 G/DL — LOW (ref 14–18)
LYMPHOCYTES # BLD AUTO: 1.1 K/UL — SIGNIFICANT CHANGE UP (ref 1–4.8)
LYMPHOCYTES # BLD AUTO: 16.8 % — LOW (ref 20–55)
MCHC RBC-ENTMCNC: 29.4 PG — SIGNIFICANT CHANGE UP (ref 27–31)
MCHC RBC-ENTMCNC: 34.3 G/DL — SIGNIFICANT CHANGE UP (ref 32–36)
MCV RBC AUTO: 85.6 FL — SIGNIFICANT CHANGE UP (ref 80–94)
MONOCYTES # BLD AUTO: 0.9 K/UL — HIGH (ref 0–0.8)
MONOCYTES NFR BLD AUTO: 13.2 % — HIGH (ref 3–10)
NEUTROPHILS # BLD AUTO: 4.4 K/UL — SIGNIFICANT CHANGE UP (ref 1.8–8)
NEUTROPHILS NFR BLD AUTO: 66.8 % — SIGNIFICANT CHANGE UP (ref 37–73)
PHOSPHATE SERPL-MCNC: 2.8 MG/DL — SIGNIFICANT CHANGE UP (ref 2.4–4.7)
PLATELET # BLD AUTO: 235 K/UL — SIGNIFICANT CHANGE UP (ref 150–400)
POTASSIUM SERPL-MCNC: 4.2 MMOL/L — SIGNIFICANT CHANGE UP (ref 3.5–5.3)
POTASSIUM SERPL-SCNC: 4.2 MMOL/L — SIGNIFICANT CHANGE UP (ref 3.5–5.3)
PROT SERPL-MCNC: 6.1 G/DL — LOW (ref 6.6–8.7)
PTH-INTACT FLD-MCNC: 41 PG/ML — SIGNIFICANT CHANGE UP (ref 15–65)
RBC # BLD: 3.95 M/UL — LOW (ref 4.6–6.2)
RBC # FLD: 13.1 % — SIGNIFICANT CHANGE UP (ref 11–15.6)
SODIUM SERPL-SCNC: 134 MMOL/L — LOW (ref 135–145)
URATE SERPL-MCNC: 6.1 MG/DL — SIGNIFICANT CHANGE UP (ref 3.4–7)
WBC # BLD: 6.5 K/UL — SIGNIFICANT CHANGE UP (ref 4.8–10.8)
WBC # FLD AUTO: 6.5 K/UL — SIGNIFICANT CHANGE UP (ref 4.8–10.8)

## 2017-12-04 PROCEDURE — 71045 X-RAY EXAM CHEST 1 VIEW: CPT

## 2017-12-04 PROCEDURE — 82570 ASSAY OF URINE CREATININE: CPT

## 2017-12-04 PROCEDURE — 96374 THER/PROPH/DIAG INJ IV PUSH: CPT

## 2017-12-04 PROCEDURE — 81003 URINALYSIS AUTO W/O SCOPE: CPT

## 2017-12-04 PROCEDURE — 84300 ASSAY OF URINE SODIUM: CPT

## 2017-12-04 PROCEDURE — 76000 FLUOROSCOPY <1 HR PHYS/QHP: CPT

## 2017-12-04 PROCEDURE — 76775 US EXAM ABDO BACK WALL LIM: CPT

## 2017-12-04 PROCEDURE — 96375 TX/PRO/DX INJ NEW DRUG ADDON: CPT

## 2017-12-04 PROCEDURE — 81001 URINALYSIS AUTO W/SCOPE: CPT

## 2017-12-04 PROCEDURE — C2617: CPT

## 2017-12-04 PROCEDURE — 85610 PROTHROMBIN TIME: CPT

## 2017-12-04 PROCEDURE — 83735 ASSAY OF MAGNESIUM: CPT

## 2017-12-04 PROCEDURE — 99285 EMERGENCY DEPT VISIT HI MDM: CPT | Mod: 25

## 2017-12-04 PROCEDURE — 83935 ASSAY OF URINE OSMOLALITY: CPT

## 2017-12-04 PROCEDURE — 82436 ASSAY OF URINE CHLORIDE: CPT

## 2017-12-04 PROCEDURE — C1726: CPT

## 2017-12-04 PROCEDURE — 83970 ASSAY OF PARATHORMONE: CPT

## 2017-12-04 PROCEDURE — 84100 ASSAY OF PHOSPHORUS: CPT

## 2017-12-04 PROCEDURE — 99239 HOSP IP/OBS DSCHRG MGMT >30: CPT

## 2017-12-04 PROCEDURE — 80048 BASIC METABOLIC PNL TOTAL CA: CPT

## 2017-12-04 PROCEDURE — 93005 ELECTROCARDIOGRAM TRACING: CPT

## 2017-12-04 PROCEDURE — 82507 ASSAY OF CITRATE: CPT

## 2017-12-04 PROCEDURE — 84550 ASSAY OF BLOOD/URIC ACID: CPT

## 2017-12-04 PROCEDURE — 85027 COMPLETE CBC AUTOMATED: CPT

## 2017-12-04 PROCEDURE — 84133 ASSAY OF URINE POTASSIUM: CPT

## 2017-12-04 PROCEDURE — 80053 COMPREHEN METABOLIC PANEL: CPT

## 2017-12-04 PROCEDURE — 82310 ASSAY OF CALCIUM: CPT

## 2017-12-04 PROCEDURE — 36415 COLL VENOUS BLD VENIPUNCTURE: CPT

## 2017-12-04 PROCEDURE — 74176 CT ABD & PELVIS W/O CONTRAST: CPT

## 2017-12-04 PROCEDURE — 76770 US EXAM ABDO BACK WALL COMP: CPT

## 2017-12-04 PROCEDURE — 85730 THROMBOPLASTIN TIME PARTIAL: CPT

## 2017-12-04 RX ORDER — CYCLOBENZAPRINE HYDROCHLORIDE 10 MG/1
1 TABLET, FILM COATED ORAL
Qty: 21 | Refills: 0 | OUTPATIENT
Start: 2017-12-04 | End: 2017-12-11

## 2017-12-04 RX ORDER — CEPHALEXIN 500 MG
1 CAPSULE ORAL
Qty: 40 | Refills: 0 | OUTPATIENT
Start: 2017-12-04 | End: 2017-12-14

## 2017-12-04 RX ORDER — CEPHALEXIN 500 MG
1 CAPSULE ORAL
Qty: 0 | Refills: 0 | COMMUNITY
Start: 2017-12-04

## 2017-12-04 RX ADMIN — OXYCODONE AND ACETAMINOPHEN 1 TABLET(S): 5; 325 TABLET ORAL at 05:47

## 2017-12-04 RX ADMIN — CYCLOBENZAPRINE HYDROCHLORIDE 5 MILLIGRAM(S): 10 TABLET, FILM COATED ORAL at 03:38

## 2017-12-04 RX ADMIN — Medication 500 MILLIGRAM(S): at 05:47

## 2017-12-04 RX ADMIN — Medication 500 MILLIGRAM(S): at 17:11

## 2017-12-04 RX ADMIN — CYCLOBENZAPRINE HYDROCHLORIDE 5 MILLIGRAM(S): 10 TABLET, FILM COATED ORAL at 12:52

## 2017-12-04 RX ADMIN — Medication 500 MILLIGRAM(S): at 12:48

## 2017-12-04 RX ADMIN — OXYCODONE AND ACETAMINOPHEN 1 TABLET(S): 5; 325 TABLET ORAL at 12:28

## 2017-12-04 RX ADMIN — Medication 100 MILLIGRAM(S): at 05:47

## 2017-12-04 RX ADMIN — OXYCODONE AND ACETAMINOPHEN 1 TABLET(S): 5; 325 TABLET ORAL at 13:48

## 2017-12-04 NOTE — DISCHARGE NOTE ADULT - CARE PROVIDER_API CALL
Esvin Stoner), Internal Medicine; Nephrology  [address] [facility]  [address]  [place], [place] [id number]  Phone: [phone]  Fax: [phone]    [address] [address]  Phone: [phone]  Fax: [phone]  Phone: (   )    -  Fax: (   )    -    Migle Saunders), Urology  [address] [place] [address]  Phone: [phone]  Fax: [phone]

## 2017-12-04 NOTE — DISCHARGE NOTE ADULT - OTHER SIGNIFICANT FINDINGS
11.6   6.5   )-----------( 235      ( 04 Dec 2017 07:25 )             33.8     12-04  134<L>  |  97<L>  |  18.0  ----------------------------<  94  4.2   |  24.0  |  1.88<H>  Ca    8.4<L>      04 Dec 2017 07:25  Phos  2.8     12-04  TPro  6.1<L>  /  Alb  2.9<L>  /  TBili  0.8  /  DBili  x   /  AST  28  /  ALT  22  /  AlkPhos  218<H>  12-04       EXAM:  US KIDNEY(S)                        PROCEDURE DATE:  12/03/2017    INTERPRETATION:  CLINICAL INFORMATION: Renal failure.  COMPARISON: 11/26/2017.  TECHNIQUE: Sonography of the kidneys and bladder.   FINDINGS:  Right kidney:14.1 cm. No renal mass or calculi. There is minimal   hydronephrosis. There is a right ureteral stent.  Left kidney:  12.8 cm. No renal mass, hydronephrosis or calculi.  Urinary bladder: Right ureteral stent.  IMPRESSION: Minimal right hydronephrosis status post ureteral stent.  VAISHALI CUMMINS M.D., ATTENDING RADIOLOGIST  This document has been electronically signed. Dec  3 2017  4:33PM       EXAM:  CT RENAL STONE HUNT                        PROCEDURE DATE:  11/29/2017    INTERPRETATION:  CT renal stone   (CT of the abdomen and pelvis without   contrast)  CLINICAL INFORMATION:      Renal colic.  TECHNIQUE:  Contiguous axial 5 mm sections were obtained using single   helical acquisition through the abdomen and pelvis and were reconstructed   as axial 5 mm sections.  Higher resolution axial and coronal images were   reconstructed through  the kidneys.   Oral and intravenous contrast were   withheld for this indication.      FINDINGS:   No previous examinations are available for review.  There is an obstructing distal right ureteral stone just proximal to the   UV junction measuring 3 mm a causing a moderate right   hydroureteronephrosis with perinephric and periureteric stranding, of.   There are bilateral lung 1-2 mm in nonobstructed calyceal stones within   the right lower pole calyx in the left midpole calyx. Left kidney and   collecting system are otherwise normal. The  A bladder is collapsed.  Prostate and seminal vesicles within normal limits..  The lung bases are clear.       The liver demonstrates homogeneous attenuation with no focal lesion,   allowing for the noncontrast technique.  Hepatic size and contours are   maintained.  Hepatic and portal veins are not displaced.  No intrahepatic   or common ductal dilatation is recognized.  The gallbladder demonstrates   no calcified calculi or wall thickening.  The pancreas is intact without   ductal dilatation or focal lesion.  The spleen is normal in size.  No adrenal masses are found.  No enlarged lymph nodes are found.  No ascites is present.  The osseous   structures are intact.  The bowel shows nonobstructive is of buckled thirdsegment of duodenal   sweep adjacent to aorta and IVC without proximal of stomach distention.   Remaining small bowel unremarkable. Large bowel shows moderate stool   within the right colon with distention of cecum measuring 7.5 cm of. .    No obstruction, perforation or abscess is recognized.       There is a left inguinal hernia containing omental fat and fluid..  IMPRESSION:   Obstructing distal right ureteral 3 mm stone causing   moderate right hydroureteronephrosis with perinephric and periureteric   stranding as described.  Atypical a buckle third segment of duodenal sweep without proximal   obstruction.  Bilateral renal calyceal nonobstructing 1-2 mm stones.  Left inguinal hernia.  AVA BRIZUELA M.D., ATTENDING RADIOLOGIST  This document has been electronically signed. Nov 29 2017  6:39PM

## 2017-12-04 NOTE — PROGRESS NOTE ADULT - PROBLEM SELECTOR PROBLEM 3
Renal calculus, right
ELDON (acute kidney injury)
ELDON (acute kidney injury)
Renal calculus, right
ELDON (acute kidney injury)

## 2017-12-04 NOTE — PROGRESS NOTE ADULT - PROVIDER SPECIALTY LIST ADULT
Family Medicine
Nephrology
Urology
Family Medicine

## 2017-12-04 NOTE — PROGRESS NOTE ADULT - PROBLEM SELECTOR PLAN 1
Patient s/p R ureteroscopy, with R ureteral stent placement  c/w tamsulosin 0.4 milliGRAM(s) Oral at bedtime  IVF decreased to 80 cc/hr 2/2 concern for fluid overload indicated by hyponatremia  C/w cephalexin 500 milliGRAM(s) Oral four times a day. started on 12/02/2017  Pain: morphine  - Injectable 1 milliGRAM(s) IV Push every 3 hours PRN  oxyCODONE    5 mG/acetaminophen 325 mG 1 Tablet(s) Oral every 4 hours PRN  Urology consulted appreciated Patient s/p R ureteroscopy, with R ureteral stent placement  c/w tamsulosin 0.4 milliGRAM(s) Oral at bedtime  IVF decreased to 80 cc/hr 2/2 concern for fluid overload indicated by hyponatremia  C/w cephalexin 500 milliGRAM(s) Oral four times a day. started on 12/02/2017  Pain: morphine  - Injectable 1 milliGRAM(s) IV Push every 3 hours PRN  oxyCODONE    5 mG/acetaminophen 325 mG 1 Tablet(s) Oral every 4 hours PRN  Urology consulted appreciated, recommended to follow as outpatient

## 2017-12-04 NOTE — PROGRESS NOTE ADULT - PROBLEM SELECTOR PLAN 6
Bowel movement yesterday  c/w Colace 100 mg BID, senna PO daily
Bowel movement this AM.  c/w Colace 100 mg BID, senna PO daily
DVT prophylaxis: Lovenox 40mg SQ q24h

## 2017-12-04 NOTE — DISCHARGE NOTE ADULT - FINDINGS/TREATMENT
the urologist put a little tube in your kidney system to help drain the obstrucion cause by the kidney stone. it is possible that you note some mild blood in the urine. If you have pain  during urination the blood increases, or have fever, please come back to the Ed. Follow with your urologist as indicated the urologist put a little tube in your kidney system to help drain the obstruction cause by the kidney stone. it is possible that you note some mild blood in the urine. If you have pain  during urination the blood increases, or have fever, please come back to the Ed. Follow with your urologist as indicated

## 2017-12-04 NOTE — PROGRESS NOTE ADULT - PROBLEM SELECTOR PROBLEM 1
Intractable pain
Renal calculus, right
Intractable pain
Renal calculus, right

## 2017-12-04 NOTE — PROGRESS NOTE ADULT - ASSESSMENT
62 year old male with PMH of renal stones 7 year prior with 3mm right obstructing calculus and intractable pain. Patient taken for ureteroscopy 12/2 with R ureteral stent placement. Pt has clinically improved with 4-5 rt flank pain and episode of fever on 12/02-03/2017 that responded to tylenol. Us done after stent placement and due to creat elevated shows mild hydronephrosis, decreased compared to findings on CT scan. Nephrology is on board, recss appreciated.  He is tolerating PO diet, voinding had a BM yesterday. Will wait for morning labs 62 year old male with PMH of renal stones 7 year prior with 3mm right obstructing calculus and intractable pain. Patient taken for ureteroscopy 12/2 with R ureteral stent placement. Pt has clinically improved with 4-5 rt flank pain and episode of fever on 12/02-03/2017 that responded to tylenol. Us done after stent placement and due to creat elevated shows mild hydronephrosis, decreased compared to findings on CT scan. Nephrology is on board, recss appreciated.  He is tolerating PO diet, voiding had a BM yesterday. Today's creatinine still elevated but trending down, likely 2/2 recent kidney obstructing stone, treated. As per nephrology recommendations, pt can follow up with them as outpatient.

## 2017-12-04 NOTE — DISCHARGE NOTE ADULT - HOSPITAL COURSE
62 year old male with PMH of renal stones 7 year prior with 3mm right obstructing calculus and intractable pain. Patient taken for ureteroscopy 12/2 with R ureteral stent placement. Pt has clinically improved with 4-5 rt flank pain and episode of fever on 12/02-03/2017 that responded to tylenol. Us done after stent placement and due to creat elevated shows mild hydronephrosis, decreased compared to findings on CT scan. Nephrology is on board, recss to follow as outpatient.  He is tolerating PO diet, voiding had a BM yesterday. Plan is for discharge since pt is medically optimized    40 min spent coordinating this discharge 62 year old male with PMH of renal stones 7 year prior with 3mm right obstructing calculus and intractable pain. Patient taken for ureteroscopy 12/2 with R ureteral stent placement. Pt has clinically improved with 4-5 rt flank pain and episode of fever on 12/02-03/2017 that responded to tylenol. Us done after stent placement and due to creat elevated shows mild hydronephrosis, decreased compared to findings on CT scan. Nephrology is on board, recss to follow as outpatient. Last creat: 1.88, improved from 2.01  He is tolerating PO diet, voiding had a BM yesterday. Plan is for discharge since pt is medically optimized    40 min spent coordinating this discharge 62 year old male with PMH of renal stones 7 year prior with 3mm right obstructing calculus and intractable pain. Patient taken for ureteroscopy 12/2 with R ureteral stent placement. Pt has clinically improved with 4-5 rt flank pain and episode of fever on 12/02-03/2017 that responded to tylenol. Ultrasound done after stent placement and due to creat elevated shows mild hydronephrosis, decreased compared to findings on CT scan. Last creat: 1.88, improved from 2.01. Seen in consultation by Nephrology, who advised close outpatient follow up. Patient has been advised to ensure plentiful oral fluid intake.  He is tolerating PO diet, voiding, had a BM yesterday. Given that patient has had resolution of symptoms and his ELDON is improving he is being discharged home in a stable and improved condition.    40 min spent coordinating this discharge

## 2017-12-04 NOTE — DISCHARGE NOTE ADULT - MEDICATION SUMMARY - MEDICATIONS TO TAKE
I will START or STAY ON the medications listed below when I get home from the hospital:    oxyCODONE-acetaminophen 5 mg-325 mg oral tablet  -- 1 tab(s) by mouth every 6 hours MDD:four  -- Caution federal law prohibits the transfer of this drug to any person other  than the person for whom it was prescribed.  May cause drowsiness.  Alcohol may intensify this effect.  Use care when operating dangerous machinery.  This prescription cannot be refilled.  This product contains acetaminophen.  Do not use  with any other product containing acetaminophen to prevent possible liver damage.  Using more of this medication than prescribed may cause serious breathing problems.    -- Indication: For moderate pain    tamsulosin 0.4 mg oral capsule  -- 1 cap(s) by mouth once a day  -- It is very important that you take or use this exactly as directed.  Do not skip doses or discontinue unless directed by your doctor.  May cause drowsiness.  Alcohol may intensify this effect.  Use care when operating dangerous machinery.  Some non-prescription drugs may aggravate your condition.  Read all labels carefully.  If a warning appears, check with your doctor before taking.  Swallow whole.  Do not crush.  Take with food or milk.      -- Indication: For Kidney stones    cephalexin 500 mg oral capsule  -- 1 cap(s) by mouth 4 times a day  -- Indication: For stent I will START or STAY ON the medications listed below when I get home from the hospital:    oxyCODONE-acetaminophen 5 mg-325 mg oral tablet  -- 1 tab(s) by mouth every 6 hours MDD:four  -- Caution federal law prohibits the transfer of this drug to any person other  than the person for whom it was prescribed.  May cause drowsiness.  Alcohol may intensify this effect.  Use care when operating dangerous machinery.  This prescription cannot be refilled.  This product contains acetaminophen.  Do not use  with any other product containing acetaminophen to prevent possible liver damage.  Using more of this medication than prescribed may cause serious breathing problems.    -- Indication: For moderate pain    tamsulosin 0.4 mg oral capsule  -- 1 cap(s) by mouth once a day  -- It is very important that you take or use this exactly as directed.  Do not skip doses or discontinue unless directed by your doctor.  May cause drowsiness.  Alcohol may intensify this effect.  Use care when operating dangerous machinery.  Some non-prescription drugs may aggravate your condition.  Read all labels carefully.  If a warning appears, check with your doctor before taking.  Swallow whole.  Do not crush.  Take with food or milk.      -- Indication: For Kidney stones    cephalexin 500 mg oral capsule  -- 1 cap(s) by mouth 4 times a day MDD:4 tabs  -- Indication: For stent    cyclobenzaprine 5 mg oral tablet  -- 1 tab(s) by mouth 3 times a day, As needed, Muscle Spasm MDD:3 tabs  -- Indication: For Pain

## 2017-12-04 NOTE — PROGRESS NOTE ADULT - SUBJECTIVE AND OBJECTIVE BOX
Patient is a 62y old  Male who presents with a chief complaint of Right Flank pain (30 Nov 2017 01:08)      INTERVAL HPI/OVERNIGHT EVENTS:  62y  Male seen and examined at bedside. Pt was on the bed. Pt has no complaints, reports that his right flank pain has improved, now at he is  tolerating po intake voiding clear urine, last bowel yesterday. Pt denies fever, chills, SOB, CP, nausea, vomiting, abd pain, dysuria, hematuria, urinary freq, LE edema,/pain.      Allergies    No Known Allergies    Vital Signs Last 24 Hrs  T(C): 36.9 (04 Dec 2017 05:43), Max: 37.7 (03 Dec 2017 23:42)  T(F): 98.5 (04 Dec 2017 05:43), Max: 99.9 (03 Dec 2017 23:42)  HR: 101 (04 Dec 2017 05:43) (99 - 104)  BP: 150/81 (04 Dec 2017 05:43) (129/80 - 150/81)  RR: 18 (03 Dec 2017 23:42) (18 - 18)  SpO2: 96% (03 Dec 2017 23:42) (96% - 96%)    I&O's Summary    03 Dec 2017 07:01  -  04 Dec 2017 07:00  --------------------------------------------------------  IN: 1425 mL / OUT: 750 mL / NET: 675 mL        I&O's Detail    01 Dec 2017 07:01  -  02 Dec 2017 07:00  --------------------------------------------------------  IN:    lactated ringers.: 500 mL  Total IN: 500 mL    OUT:    Voided: 400 mL  Total OUT: 400 mL    Total NET: 100 mL      PHYSICAL EXAM:    GENERAL: NAD, well-groomed, well-developed  HEAD:  Atraumatic, Normocephalic  EYES: EOMI, PERRLA, conjunctiva and sclera clear  ENMT: No tonsillar erythema, exudates, or enlargement; Moist mucous membranes  NECK: Supple, No JVD, Normal thyroid  NERVOUS SYSTEM:  Alert & Oriented X3, Good concentration; Motor Strength 5/5 B/L upper and lower extremities; DTRs 2+ intact and symmetric  CHEST/LUNG: Clear to percussion bilaterally; No rales, rhonchi, wheezing, or rubs  HEART: Regular rate and rhythm; No murmurs, rubs, or gallops  BACK: mild Right flank tenderness  ABDOMEN: Soft, Nontender, Nondistended; Bowel sounds present  EXTREMITIES:  2+ Peripheral Pulses, No clubbing, cyanosis, or edema  SKIN: No rashes or lesions  URINARY: pt has a Fischer bag, draining clear urine      LABS:                  CBC Full  -  ( 02 Dec 2017 05:11 )  WBC Count : 10.1 K/uL  Hemoglobin : 12.5 g/dL  Hematocrit : 36.8 %  Platelet Count - Automated : 205 K/uL  Mean Cell Volume : 86.0 fl  Mean Cell Hemoglobin : 29.2 pg  Mean Cell Hemoglobin Concentration : 34.0 g/dL  Auto Neutrophil # : 8.0 K/uL  Auto Lymphocyte # : 0.8 K/uL  Auto Monocyte # : 1.3 K/uL  Auto Eosinophil # : 0.0 K/uL  Auto Basophil # : 0.0 K/uL  Auto Neutrophil % : 78.6 %  Auto Lymphocyte % : 8.1 %  Auto Monocyte % : 12.4 %  Auto Eosinophil % : 0.5 %  Auto Basophil % : 0.1 %    12-03    134<L>  |  99  |  19.0  ----------------------------<  113  4.1   |  24.0  |  2.01<H>    Ca    7.9<L>      03 Dec 2017 06:50    RADIOLOGY & ADDITIONAL TESTS:   EXAM:  US KIDNEY(S)                        PROCEDURE DATE:  12/03/2017    INTERPRETATION:  CLINICAL INFORMATION: Renal failure.  COMPARISON: 11/26/2017.  TECHNIQUE: Sonography of the kidneys and bladder.   FINDINGS:  Right kidney:14.1 cm. No renal mass or calculi. There is minimal   hydronephrosis. There is a right ureteral stent.  Left kidney:  12.8 cm. No renal mass, hydronephrosis or calculi.  Urinary bladder: Right ureteral stent.    IMPRESSION: Minimal right hydronephrosis status post ureteral stent.  VAISHALI CUMMINS M.D., ATTENDING RADIOLOGIST  This document has been electronically signed. Dec  3 2017  4:33PM         EXAM:  CT RENAL STONE HUNT                        PROCEDURE DATE:  11/29/2017    INTERPRETATION:  CT renal stone   (CT of the abdomen and pelvis without   contrast)  CLINICAL INFORMATION:      Renal colic.    TECHNIQUE:  Contiguous axial 5 mm sections were obtained using single   helical acquisition through the abdomen and pelvis and were reconstructed   as axial 5 mm sections.  Higher resolution axial and coronal images were   reconstructed through  the kidneys.   Oral and intravenous contrast were   withheld for this indication.      FINDINGS:   No previous examinations are available for review.  There is an obstructing distal right ureteral stone just proximal to the   UV junction measuring 3 mm a causing a moderate right   hydroureteronephrosis with perinephric and periureteric stranding, of.   There are bilateral lung 1-2 mm in nonobstructed calyceal stones within   the right lower pole calyx in the left midpole calyx. Left kidney and   collecting system are otherwise normal. The  A bladder is collapsed.  Prostate and seminal vesicles within normal limits..  The lung bases are clear.       The liver demonstrates homogeneous attenuation with no focal lesion,   allowing for the noncontrast technique.  Hepatic size and contours are   maintained.  Hepatic and portal veins are not displaced.  No intrahepatic   or common ductal dilatation is recognized.  The gallbladder demonstrates   no calcified calculi or wall thickening.  The pancreas is intact without   ductal dilatation or focal lesion.  The spleen is normal in size.  No adrenal masses are found.  No enlarged lymph nodes are found.  No ascites is present.  The osseous   structures are intact.  The bowel shows nonobstructive is of buckled thirdsegment of duodenal   sweep adjacent to aorta and IVC without proximal of stomach distention.   Remaining small bowel unremarkable. Large bowel shows moderate stool   within the right colon with distention of cecum measuring 7.5 cm of. .    No obstruction, perforation or abscess is recognized.       There is a left inguinal hernia containing omental fat and fluid..  IMPRESSION:   Obstructing distal right ureteral 3 mm stone causing   moderate right hydroureteronephrosis with perinephric and periureteric   stranding as described.  Atypical a buckle third segment of duodenal sweep without proximal   obstruction.  Bilateral renal calyceal nonobstructing 1-2 mm stones.  Left inguinal hernia.  AVA BRIZUELA M.D., ATTENDING RADIOLOGIST  This document has been electronically signed. Nov 29 2017  6:39PM      MEDICATIONS  (STANDING):  calcium acetate 667 milliGRAM(s) Oral four times a day with meals  cephalexin 500 milliGRAM(s) Oral four times a day  docusate sodium 100 milliGRAM(s) Oral two times a day  enoxaparin Injectable 30 milliGRAM(s) SubCutaneous daily  famotidine    Tablet 20 milliGRAM(s) Oral daily  polyethylene glycol 3350 17 Gram(s) Oral daily  senna 1 Tablet(s) Oral daily  sodium chloride 0.9%. 1000 milliLiter(s) (80 mL/Hr) IV Continuous <Continuous>  tamsulosin 0.4 milliGRAM(s) Oral at bedtime    MEDICATIONS  (PRN):  cyclobenzaprine 5 milliGRAM(s) Oral three times a day PRN Muscle Spasm  morphine  - Injectable 1 milliGRAM(s) IV Push every 3 hours PRN Breakthrough pain  oxyCODONE    5 mG/acetaminophen 325 mG 1 Tablet(s) Oral every 4 hours PRN Severe Pain (7 - 10) Patient is a 62y old  Male who presents with a chief complaint of Right Flank pain (30 Nov 2017 01:08)      INTERVAL HPI/OVERNIGHT EVENTS:  62y  Male seen and examined at bedside. Pt was on the bed. Pt has no complaints, reports that his right flank pain has improved, now at he is  tolerating po intake voiding clear urine, last bowel yesterday. Pt denies fever, chills, SOB, CP, nausea, vomiting, abd pain, dysuria, hematuria, urinary freq, LE edema,/pain.      Allergies    No Known Allergies    Vital Signs Last 24 Hrs  T(C): 36.9 (04 Dec 2017 05:43), Max: 37.7 (03 Dec 2017 23:42)  T(F): 98.5 (04 Dec 2017 05:43), Max: 99.9 (03 Dec 2017 23:42)  HR: 101 (04 Dec 2017 05:43) (99 - 104)  BP: 150/81 (04 Dec 2017 05:43) (129/80 - 150/81)  RR: 18 (03 Dec 2017 23:42) (18 - 18)  SpO2: 96% (03 Dec 2017 23:42) (96% - 96%)    I&O's Summary    03 Dec 2017 07:01  -  04 Dec 2017 07:00  --------------------------------------------------------  IN: 1425 mL / OUT: 750 mL / NET: 675 mL        I&O's Detail    01 Dec 2017 07:01  -  02 Dec 2017 07:00  --------------------------------------------------------  IN:    lactated ringers.: 500 mL  Total IN: 500 mL    OUT:    Voided: 400 mL  Total OUT: 400 mL    Total NET: 100 mL      PHYSICAL EXAM:    GENERAL: NAD, well-groomed, well-developed  HEAD:  Atraumatic, Normocephalic  EYES: EOMI, PERRLA, conjunctiva and sclera clear  ENMT: No tonsillar erythema, exudates, or enlargement; Moist mucous membranes  NECK: Supple, No JVD, Normal thyroid  NERVOUS SYSTEM:  Alert & Oriented X3, Good concentration; Motor Strength 5/5 B/L upper and lower extremities; DTRs 2+ intact and symmetric  CHEST/LUNG: Clear to percussion bilaterally; No rales, rhonchi, wheezing, or rubs  HEART: Regular rate and rhythm; No murmurs, rubs, or gallops  BACK: mild Right flank tenderness  ABDOMEN: Soft, Nontender, Nondistended; Bowel sounds present  EXTREMITIES:  2+ Peripheral Pulses, No clubbing, cyanosis, or edema  SKIN: No rashes or lesions        LABS:                  CBC Full  -  ( 02 Dec 2017 05:11 )  WBC Count : 10.1 K/uL  Hemoglobin : 12.5 g/dL  Hematocrit : 36.8 %  Platelet Count - Automated : 205 K/uL  Mean Cell Volume : 86.0 fl  Mean Cell Hemoglobin : 29.2 pg  Mean Cell Hemoglobin Concentration : 34.0 g/dL  Auto Neutrophil # : 8.0 K/uL  Auto Lymphocyte # : 0.8 K/uL  Auto Monocyte # : 1.3 K/uL  Auto Eosinophil # : 0.0 K/uL  Auto Basophil # : 0.0 K/uL  Auto Neutrophil % : 78.6 %  Auto Lymphocyte % : 8.1 %  Auto Monocyte % : 12.4 %  Auto Eosinophil % : 0.5 %  Auto Basophil % : 0.1 %    12-03    134<L>  |  99  |  19.0  ----------------------------<  113  4.1   |  24.0  |  2.01<H>    Ca    7.9<L>      03 Dec 2017 06:50    RADIOLOGY & ADDITIONAL TESTS:   EXAM:  US KIDNEY(S)                        PROCEDURE DATE:  12/03/2017    INTERPRETATION:  CLINICAL INFORMATION: Renal failure.  COMPARISON: 11/26/2017.  TECHNIQUE: Sonography of the kidneys and bladder.   FINDINGS:  Right kidney:14.1 cm. No renal mass or calculi. There is minimal   hydronephrosis. There is a right ureteral stent.  Left kidney:  12.8 cm. No renal mass, hydronephrosis or calculi.  Urinary bladder: Right ureteral stent.    IMPRESSION: Minimal right hydronephrosis status post ureteral stent.  VAISHALI CUMMINS M.D., ATTENDING RADIOLOGIST  This document has been electronically signed. Dec  3 2017  4:33PM         EXAM:  CT RENAL STONE HUNT                        PROCEDURE DATE:  11/29/2017    INTERPRETATION:  CT renal stone   (CT of the abdomen and pelvis without   contrast)  CLINICAL INFORMATION:      Renal colic.    TECHNIQUE:  Contiguous axial 5 mm sections were obtained using single   helical acquisition through the abdomen and pelvis and were reconstructed   as axial 5 mm sections.  Higher resolution axial and coronal images were   reconstructed through  the kidneys.   Oral and intravenous contrast were   withheld for this indication.      FINDINGS:   No previous examinations are available for review.  There is an obstructing distal right ureteral stone just proximal to the   UV junction measuring 3 mm a causing a moderate right   hydroureteronephrosis with perinephric and periureteric stranding, of.   There are bilateral lung 1-2 mm in nonobstructed calyceal stones within   the right lower pole calyx in the left midpole calyx. Left kidney and   collecting system are otherwise normal. The  A bladder is collapsed.  Prostate and seminal vesicles within normal limits..  The lung bases are clear.       The liver demonstrates homogeneous attenuation with no focal lesion,   allowing for the noncontrast technique.  Hepatic size and contours are   maintained.  Hepatic and portal veins are not displaced.  No intrahepatic   or common ductal dilatation is recognized.  The gallbladder demonstrates   no calcified calculi or wall thickening.  The pancreas is intact without   ductal dilatation or focal lesion.  The spleen is normal in size.  No adrenal masses are found.  No enlarged lymph nodes are found.  No ascites is present.  The osseous   structures are intact.  The bowel shows nonobstructive is of buckled thirdsegment of duodenal   sweep adjacent to aorta and IVC without proximal of stomach distention.   Remaining small bowel unremarkable. Large bowel shows moderate stool   within the right colon with distention of cecum measuring 7.5 cm of. .    No obstruction, perforation or abscess is recognized.       There is a left inguinal hernia containing omental fat and fluid..  IMPRESSION:   Obstructing distal right ureteral 3 mm stone causing   moderate right hydroureteronephrosis with perinephric and periureteric   stranding as described.  Atypical a buckle third segment of duodenal sweep without proximal   obstruction.  Bilateral renal calyceal nonobstructing 1-2 mm stones.  Left inguinal hernia.  AVA BRIZUELA M.D., ATTENDING RADIOLOGIST  This document has been electronically signed. Nov 29 2017  6:39PM      MEDICATIONS  (STANDING):  calcium acetate 667 milliGRAM(s) Oral four times a day with meals  cephalexin 500 milliGRAM(s) Oral four times a day  docusate sodium 100 milliGRAM(s) Oral two times a day  enoxaparin Injectable 30 milliGRAM(s) SubCutaneous daily  famotidine    Tablet 20 milliGRAM(s) Oral daily  polyethylene glycol 3350 17 Gram(s) Oral daily  senna 1 Tablet(s) Oral daily  sodium chloride 0.9%. 1000 milliLiter(s) (80 mL/Hr) IV Continuous <Continuous>  tamsulosin 0.4 milliGRAM(s) Oral at bedtime    MEDICATIONS  (PRN):  cyclobenzaprine 5 milliGRAM(s) Oral three times a day PRN Muscle Spasm  morphine  - Injectable 1 milliGRAM(s) IV Push every 3 hours PRN Breakthrough pain  oxyCODONE    5 mG/acetaminophen 325 mG 1 Tablet(s) Oral every 4 hours PRN Severe Pain (7 - 10)

## 2017-12-04 NOTE — PROGRESS NOTE ADULT - SUBJECTIVE AND OBJECTIVE BOX
patient seen and examined  tmax =tnow 100  vss  had ultrasound:    Right kidney: 14.1 cm. No renal mass or calculi. There is minimal   hydronephrosis. There is a right ureteral stent.     Left kidney: 12.8 cm. No renal mass, hydronephrosis or calculi.     Urinary bladder: Right ureteral stent.     IMPRESSION: Minimal right hydronephrosis status post ureteral stent.     12-04    134<L>  |  97<L>  |  18.0  ----------------------------<  94  4.2   |  24.0  |  1.88<H>    Ca    8.4<L>      04 Dec 2017 07:25  Phos  2.8     12-04    TPro  6.1<L>  /  Alb  2.9<L>  /  TBili  0.8  /  DBili  x   /  AST  28  /  ALT  22  /  AlkPhos  218<H>  12-04  voiding well  24 hour urine collection in progress

## 2017-12-04 NOTE — PROGRESS NOTE ADULT - PROBLEM SELECTOR PROBLEM 2
ELDON (acute kidney injury)
Hydroureteronephrosis

## 2017-12-04 NOTE — DISCHARGE NOTE ADULT - PATIENT PORTAL LINK FT
“You can access the [facility] Patient Portal, offered by [facility], [place], by registering with the following website: [email or web address]”

## 2017-12-04 NOTE — DISCHARGE NOTE ADULT - CARE PLAN
Principal Discharge DX:	Kidney stones  Goal:	avoid complications  Instructions for follow-up, activity and diet:	you had a kidney stone that was obstructing your kidney that was the ause of the pain and was causeing kidney inflamation and urinary symptoms.. The urologist put a "stent" which is a device that hels your kidney to pass the urine and decrease the inflamation process. Please monmitor for urinary bleeding, burning during urination, increased flank pain, fever u other symptoms and come toe ED if you have them. take medications as indicated and follow with your urologist within 5 days.  Secondary Diagnosis:	ELDON (acute kidney injury) Principal Discharge DX:	Kidney stones  Goal:	avoid complications  Instructions for follow-up, activity and diet:	you had a kidney stone that was obstructing your kidney that was the cause of the pain and was causing kidney inflammation and urinary symptoms.. The urologist put a "stent" which is a device that helps your kidney to pass the urine and decrease the inflamation process. Please monmitor for urinary bleeding, burning during urination, increased flank pain, fever u other symptoms and come toe ED if you have them. take medications as indicated and follow with your urologist within 5 days.  Secondary Diagnosis:	ELDON (acute kidney injury)  Goal:	resolution, avoid complications  Instructions for follow-up, activity and diet:	Your kidneys were stressed out and the cause is most likely the obstruction that the stone caused on your renal system. Since the obstruction is already treated and we have hydrated you, your kidney function is now getting better. Please follow with your nephrologist within 5 days. Principal Discharge DX:	Kidney stones  Goal:	avoid complications  Instructions for follow-up, activity and diet:	you had a kidney stone that was obstructing your kidney that was the cause of the pain and was causing kidney inflammation and urinary symptoms.. The urologist put a "stent" which is a device that helps your kidney to pass the urine and decrease the inflammation process. Please monitor for urinary bleeding, burning during urination, increased flank pain, fever and other symptoms and come to ED if you have them. take medications as indicated and follow with your urologist within 5 days.  Secondary Diagnosis:	ELDON (acute kidney injury)  Goal:	resolution, avoid complications  Instructions for follow-up, activity and diet:	Your kidneys were stressed out and the cause is most likely the obstruction that the stone caused on your renal system. Since the obstruction is already treated and we have hydrated you, your kidney function is now getting better. Please follow with your nephrologist within 5 days.

## 2017-12-04 NOTE — PROGRESS NOTE ADULT - PROBLEM SELECTOR PLAN 3
Cr 2.01 this AM. Unknown etiology. Concern for Post-renal failure.  Bladder scan =negative  Urine electrolytes, repeat UA, Renal Sono: mild hydronephrosis, less than showed in CT scan before stent placement  Nephro consult requested, Urology notified (rec Renal u/s)  Will wait for PTH, uric acid. Cr 1.88 this AM, trending down. Unknown etiology but liekelly Post-renal failure.  Bladder scan =negative  Urine electrolytes, repeat UA, Renal Sono: mild hydronephrosis, less than showed in CT scan before stent placement  Nephro recommendations appreciated, mentioned that will follow as outpatient  Urology notified (rec Renal u/s)  Will wait for PTH, uric acid.

## 2017-12-04 NOTE — DISCHARGE NOTE ADULT - INSTRUCTIONS
Continue a DASH (Dietary Approaches to Stop Hypertension) diet. Include more fruits, vegetables, whole grains, and low-fat dairy. Reduce intake of added sugars, solid fats, refined grains, and sodium. Limit foods that are high in saturated fat, such as fatty meats, full-fat dairy products, and tropical oils such as coconut, palm kernel, and palm oils. Limit sugar-sweetened beverages and sweets. When following the DASH eating plan, it is important to choose foods that are:  Low in saturated and trans fats  Rich in potassium, calcium, magnesium, fiber, and protein  Lower in sodium •Heart-healthy fish. Eat heart-healthy fish at least twice a week. Fish can be a good alternative to high-fat meats. For example, cod, tuna and halibut have less total fat, saturated fat and cholesterol than do meat and poultry. Fish such as salmon, mackerel, tuna, sardines and bluefish are rich in omega-3 fatty acids, which promote heart health by lowering blood fats called triglycerides.      Avoid fried fish and fish with high levels of mercury, such as tilefish, swordfish and brent mackerel.    •"Good" fats. Foods containing monounsaturated and polyunsaturated fats can help lower your cholesterol levels. These include avocados, almonds, pecans, walnuts, olives, and canola, olive and peanut oils. But don't overdo it, as all fats are high in calories.      Foods to avoid:  Saturated fats. High-fat dairy products and animal proteins such as beef, hot dogs, sausage and gonzalez contain saturated fats. Limit your daily calories from saturated fat to less than 7 percent.  •Trans fats. These types of fats are found in processed snacks, baked goods, shortening and stick margarines. Avoid these items.  •Cholesterol. Sources of cholesterol include high-fat dairy products and high-fat animal proteins, egg yolks, liver, and other organ meats. Aim for no more than 200 milligrams (mg) of cholesterol a day.  Sodium. Aim for less than 2,300 mg of sodium a day.

## 2017-12-04 NOTE — PROGRESS NOTE ADULT - PROBLEM SELECTOR PLAN 7
DVT prophylaxis: enoxaparin Injectable 30 milliGRAM(s) SubCutaneous daily
DVT prophylaxis: enoxaparin Injectable 30 milliGRAM(s) SubCutaneous daily

## 2017-12-04 NOTE — DISCHARGE NOTE ADULT - NS AS ACTIVITY OBS
Stairs allowed/Return to Work/School allowed/No Heavy lifting/straining/Sex allowed/Bathing allowed/Showering allowed/Walking-Outdoors allowed/Driving allowed/Walking-Indoors allowed

## 2017-12-04 NOTE — PROGRESS NOTE ADULT - PROBLEM SELECTOR PLAN 4
Serum Na 134 past 2 days. Likely 2/2 IV hydration. Decreased from 125cc/hr to 80. on 12/03/17 Serum Na 134 past 3 days. Likely 2/2 IV hydration. Decreased from 125cc/hr to 80. on 12/03/17  Oral hydration encouraged

## 2017-12-04 NOTE — PROGRESS NOTE ADULT - SUBJECTIVE AND OBJECTIVE BOX
NEPHROLOGY INTERVAL HPI/OVERNIGHT EVENTS: Feels better, iv out.    MEDICATIONS  (STANDING):  calcium acetate 667 milliGRAM(s) Oral four times a day with meals  cephalexin 500 milliGRAM(s) Oral four times a day  docusate sodium 100 milliGRAM(s) Oral two times a day  enoxaparin Injectable 30 milliGRAM(s) SubCutaneous daily  famotidine    Tablet 20 milliGRAM(s) Oral daily  polyethylene glycol 3350 17 Gram(s) Oral daily  senna 1 Tablet(s) Oral daily  tamsulosin 0.4 milliGRAM(s) Oral at bedtime    MEDICATIONS  (PRN):  cyclobenzaprine 5 milliGRAM(s) Oral three times a day PRN Muscle Spasm  morphine  - Injectable 1 milliGRAM(s) IV Push every 3 hours PRN Breakthrough pain  oxyCODONE    5 mG/acetaminophen 325 mG 1 Tablet(s) Oral every 4 hours PRN Severe Pain (7 - 10)      Allergies    No Known Allergies    Intolerances        Vital Signs Last 24 Hrs  T(C): 36.9 (04 Dec 2017 05:43), Max: 37.7 (03 Dec 2017 23:42)  T(F): 98.5 (04 Dec 2017 05:43), Max: 99.9 (03 Dec 2017 23:42)  HR: 101 (04 Dec 2017 05:43) (99 - 101)  BP: 150/81 (04 Dec 2017 05:43) (129/80 - 150/81)  BP(mean): --  RR: 18 (03 Dec 2017 23:42) (18 - 18)  SpO2: 96% (03 Dec 2017 23:42) (96% - 96%)  Daily     Daily Weight in k (03 Dec 2017 17:00)    PHYSICAL EXAM:    GENERAL: no  pain.  HEAD:    EYES:   ENMT:   NECK: veins wnl  NERVOUS SYSTEM:  wnl  CHEST/LUNG: clear  HEART:   ABDOMEN: not tender  EXTREMITIES:  trace edema  LYMPH:   SKIN:    microhematuria    LABS:                        11.6   6.5   )-----------( 235      ( 04 Dec 2017 07:25 )             33.8     12-04    134<L>  |  97<L>  |  18.0  ----------------------------<  94  4.2   |  24.0  |  1.88<H>    Ca    8.4<L>      04 Dec 2017 07:25  Phos  2.8     12-    TPro  6.1<L>  /  Alb  2.9<L>  /  TBili  0.8  /  DBili  x   /  AST  28  /  ALT  22  /  AlkPhos  218<H>  12      Urinalysis Basic - ( 03 Dec 2017 14:43 )    Color: Yellow / Appearance: Clear / S.005 / pH: x  Gluc: x / Ketone: Negative  / Bili: Negative / Urobili: 1 mg/dL   Blood: x / Protein: 30 mg/dL / Nitrite: Negative   Leuk Esterase: Moderate / RBC: 11-25 /HPF / WBC 6-10   Sq Epi: x / Non Sq Epi: Occasional / Bacteria: Occasional      Phosphorus Level, Serum: 2.8 mg/dL ( @ 07:25)          RADIOLOGY & ADDITIONAL TESTS:

## 2017-12-04 NOTE — DISCHARGE NOTE ADULT - PLAN OF CARE
avoid complications you had a kidney stone that was obstructing your kidney that was the ause of the pain and was causeing kidney inflamation and urinary symptoms.. The urologist put a "stent" which is a device that hels your kidney to pass the urine and decrease the inflamation process. Please monmitor for urinary bleeding, burning during urination, increased flank pain, fever u other symptoms and come toe ED if you have them. take medications as indicated and follow with your urologist within 5 days. you had a kidney stone that was obstructing your kidney that was the cause of the pain and was causing kidney inflammation and urinary symptoms.. The urologist put a "stent" which is a device that helps your kidney to pass the urine and decrease the inflamation process. Please monmitor for urinary bleeding, burning during urination, increased flank pain, fever u other symptoms and come toe ED if you have them. take medications as indicated and follow with your urologist within 5 days. resolution, avoid complications Your kidneys were stressed out and the cause is most likely the obstruction that the stone caused on your renal system. Since the obstruction is already treated and we have hydrated you, your kidney function is now getting better. Please follow with your nephrologist within 5 days. you had a kidney stone that was obstructing your kidney that was the cause of the pain and was causing kidney inflammation and urinary symptoms.. The urologist put a "stent" which is a device that helps your kidney to pass the urine and decrease the inflammation process. Please monitor for urinary bleeding, burning during urination, increased flank pain, fever and other symptoms and come to ED if you have them. take medications as indicated and follow with your urologist within 5 days.

## 2017-12-04 NOTE — PROGRESS NOTE ADULT - ATTENDING COMMENTS
Follow up as op.
Pt seen and examined with FM residents.  A&P reviewed.  Pain is controlled, cystoscopy with stent placement today.  ELDON - cont IVF hydration and repeat BMP in am.
Pt seen and examined with FMR.  A&P reviewed.  IV hydration, pain control, ambulation - if does not pass stone in 24-48h will need cystoscopy
Voiding freely with improvement in pain with Flexeril.  Low grade fever overnight, likely secondary to UTI.  Still with elevated SCr, unclear etiology.  Continue IVF, decrease rate to 80 as pedal edema, monitor for overload.  Continue Rocephin, repeat culture.   Urine lytes, Eosinophils, renal sonogram, Nephrology input.
Still with pain - avoids NSAIDs given ELDON.  Urology follow up.
ELODN improved.  Will discharge home with instructions for increased PO fluid intake and close follow up with Nephrology.

## 2017-12-04 NOTE — DISCHARGE NOTE ADULT - PROVIDER TOKENS
TOKEN:'[id number]',FREE:[LAST:[[facility]],FIRST:[Clinic],PHONE:[(   )    -],FAX:[(   )    -],ADDRESS:[[place] [address]  Phone: [phone]  Fax: [phone]]],TOKEN:'[id number]'

## 2017-12-06 ENCOUNTER — APPOINTMENT (OUTPATIENT)
Dept: UROLOGY | Facility: CLINIC | Age: 62
End: 2017-12-06
Payer: COMMERCIAL

## 2017-12-06 VITALS
OXYGEN SATURATION: 98 % | SYSTOLIC BLOOD PRESSURE: 119 MMHG | TEMPERATURE: 98.4 F | WEIGHT: 175 LBS | BODY MASS INDEX: 23.7 KG/M2 | HEART RATE: 92 BPM | DIASTOLIC BLOOD PRESSURE: 71 MMHG | HEIGHT: 72 IN

## 2017-12-06 DIAGNOSIS — Z82.49 FAMILY HISTORY OF ISCHEMIC HEART DISEASE AND OTHER DISEASES OF THE CIRCULATORY SYSTEM: ICD-10-CM

## 2017-12-06 DIAGNOSIS — Z87.442 PERSONAL HISTORY OF URINARY CALCULI: ICD-10-CM

## 2017-12-06 DIAGNOSIS — Z80.3 FAMILY HISTORY OF MALIGNANT NEOPLASM OF BREAST: ICD-10-CM

## 2017-12-06 DIAGNOSIS — N20.1 CALCULUS OF URETER: ICD-10-CM

## 2017-12-06 LAB
BILIRUB UR QL STRIP: NORMAL
CLARITY UR: NORMAL
COLLECTION METHOD: NORMAL
GLUCOSE UR-MCNC: NORMAL
HCG UR QL: 1 EU/DL
HGB UR QL STRIP.AUTO: NORMAL
KETONES UR-MCNC: NORMAL
LEUKOCYTE ESTERASE UR QL STRIP: NORMAL
NITRITE UR QL STRIP: NORMAL
PH UR STRIP: 7
PROT UR STRIP-MCNC: NORMAL
SP GR UR STRIP: 1.01

## 2017-12-06 PROCEDURE — 99213 OFFICE O/P EST LOW 20 MIN: CPT

## 2017-12-06 PROCEDURE — 81003 URINALYSIS AUTO W/O SCOPE: CPT | Mod: QW

## 2017-12-06 RX ORDER — TRAMADOL HYDROCHLORIDE 50 MG/1
50 TABLET, COATED ORAL
Qty: 10 | Refills: 0 | Status: DISCONTINUED | COMMUNITY
Start: 2017-11-26

## 2017-12-06 RX ORDER — MOMETASONE 50 UG/1
50 SPRAY, METERED NASAL
Qty: 17 | Refills: 0 | Status: DISCONTINUED | COMMUNITY
Start: 2017-07-27

## 2017-12-06 RX ORDER — METHYLPREDNISOLONE 4 MG/1
4 TABLET ORAL
Qty: 21 | Refills: 0 | Status: DISCONTINUED | COMMUNITY
Start: 2017-08-04

## 2017-12-06 RX ORDER — MOXIFLOXACIN HYDROCHLORIDE TABLETS, 400 MG 400 MG/1
400 TABLET, FILM COATED ORAL
Qty: 7 | Refills: 0 | Status: DISCONTINUED | COMMUNITY
Start: 2017-09-05

## 2017-12-06 RX ORDER — AMOXICILLIN AND CLAVULANATE POTASSIUM 875; 125 MG/1; MG/1
875-125 TABLET, COATED ORAL
Qty: 20 | Refills: 0 | Status: DISCONTINUED | COMMUNITY
Start: 2017-07-27

## 2017-12-07 ENCOUNTER — OUTPATIENT (OUTPATIENT)
Dept: OUTPATIENT SERVICES | Facility: HOSPITAL | Age: 62
LOS: 1 days | End: 2017-12-07
Payer: COMMERCIAL

## 2017-12-07 ENCOUNTER — MEDICATION RENEWAL (OUTPATIENT)
Age: 62
End: 2017-12-07

## 2017-12-07 ENCOUNTER — APPOINTMENT (OUTPATIENT)
Dept: RADIOLOGY | Facility: CLINIC | Age: 62
End: 2017-12-07
Payer: COMMERCIAL

## 2017-12-07 DIAGNOSIS — Z98.890 OTHER SPECIFIED POSTPROCEDURAL STATES: Chronic | ICD-10-CM

## 2017-12-07 DIAGNOSIS — Z00.8 ENCOUNTER FOR OTHER GENERAL EXAMINATION: ICD-10-CM

## 2017-12-07 PROCEDURE — 74000: CPT | Mod: 26

## 2017-12-07 PROCEDURE — 74018 RADEX ABDOMEN 1 VIEW: CPT

## 2017-12-07 RX ORDER — OXYCODONE AND ACETAMINOPHEN 5; 325 MG/1; MG/1
5-325 TABLET ORAL
Qty: 30 | Refills: 0 | Status: COMPLETED | COMMUNITY
Start: 2017-11-28 | End: 2017-12-07

## 2017-12-08 LAB
ANION GAP SERPL CALC-SCNC: 15 MMOL/L
BUN SERPL-MCNC: 18 MG/DL
CALCIUM SERPL-MCNC: 9.8 MG/DL
CHLORIDE SERPL-SCNC: 104 MMOL/L
CO2 SERPL-SCNC: 25 MMOL/L
CREAT SERPL-MCNC: 1.51 MG/DL
GLUCOSE SERPL-MCNC: 71 MG/DL
POTASSIUM SERPL-SCNC: 4.8 MMOL/L
SODIUM SERPL-SCNC: 144 MMOL/L

## 2017-12-09 LAB
CITRATE SERPL-MCNC: 245 MG/24 H — SIGNIFICANT CHANGE UP (ref 100–1300)
CITRATE UR-MCNC: 3350 ML — SIGNIFICANT CHANGE UP

## 2017-12-11 ENCOUNTER — APPOINTMENT (OUTPATIENT)
Dept: UROLOGY | Facility: CLINIC | Age: 62
End: 2017-12-11
Payer: COMMERCIAL

## 2017-12-11 VITALS
OXYGEN SATURATION: 98 % | TEMPERATURE: 98 F | DIASTOLIC BLOOD PRESSURE: 75 MMHG | SYSTOLIC BLOOD PRESSURE: 123 MMHG | HEART RATE: 90 BPM

## 2017-12-11 PROCEDURE — 99213 OFFICE O/P EST LOW 20 MIN: CPT

## 2017-12-11 NOTE — ASSESSMENT
[FreeTextEntry1] : flank pain, KUB negative\par acute kidney injury\par \par Plan:\par \par renal ultrasound\par BMP\par \par follow up by phone to review results and in 2-3 weeks in office

## 2017-12-11 NOTE — REVIEW OF SYSTEMS
[Eyesight Problems] : eyesight problems [Joint Pain] : joint pain [Muscle Weakness] : muscle weakness [Negative] : Heme/Lymph

## 2017-12-11 NOTE — HISTORY OF PRESENT ILLNESS
[FreeTextEntry1] : patient presents in follow up of ureteroscopy. no hematuria. He has intermittent right CVA discomfort.  he has not passed a stone.  no fevers or chills.\par \par got a follow up KUB and BMP.

## 2017-12-11 NOTE — PHYSICAL EXAM
[General Appearance - Well Developed] : well developed [Normal Appearance] : normal appearance [Abdomen Mass (___ Cm)] : no abdominal mass palpated [Skin Color & Pigmentation] : normal skin color and pigmentation [Heart Rate And Rhythm] : Heart rate and rhythm were normal [] : no respiratory distress [Normal Station and Gait] : the gait and station were normal for the patient's age [No Focal Deficits] : no focal deficits [FreeTextEntry1] : right CVA tenderness

## 2017-12-13 ENCOUNTER — OUTPATIENT (OUTPATIENT)
Dept: OUTPATIENT SERVICES | Facility: HOSPITAL | Age: 62
LOS: 1 days | End: 2017-12-13
Payer: COMMERCIAL

## 2017-12-13 ENCOUNTER — APPOINTMENT (OUTPATIENT)
Dept: ULTRASOUND IMAGING | Facility: CLINIC | Age: 62
End: 2017-12-13
Payer: COMMERCIAL

## 2017-12-13 DIAGNOSIS — Z98.890 OTHER SPECIFIED POSTPROCEDURAL STATES: Chronic | ICD-10-CM

## 2017-12-13 DIAGNOSIS — Z00.8 ENCOUNTER FOR OTHER GENERAL EXAMINATION: ICD-10-CM

## 2017-12-13 DIAGNOSIS — N20.1 CALCULUS OF URETER: ICD-10-CM

## 2017-12-13 PROCEDURE — 76775 US EXAM ABDO BACK WALL LIM: CPT

## 2017-12-13 PROCEDURE — 76775 US EXAM ABDO BACK WALL LIM: CPT | Mod: 26

## 2017-12-14 LAB
ANION GAP SERPL CALC-SCNC: 13 MMOL/L
BUN SERPL-MCNC: 20 MG/DL
CALCIUM SERPL-MCNC: 10.3 MG/DL
CHLORIDE SERPL-SCNC: 94 MMOL/L
CO2 SERPL-SCNC: 26 MMOL/L
CREAT SERPL-MCNC: 1.25 MG/DL
GLUCOSE SERPL-MCNC: 73 MG/DL
POTASSIUM SERPL-SCNC: 5.2 MMOL/L
SODIUM SERPL-SCNC: 133 MMOL/L

## 2017-12-27 NOTE — PROGRESS NOTE ADULT - SUBJECTIVE AND OBJECTIVE BOX
Physician Discharge Summary     Patient ID:  Rosemarie Lopez  [id number]  62 year old  1955    Admit date: 12/18/2017    Discharge date and time: 12/27/2017    Admitting Physician: Live Elise MD     Discharge Physician: Jamey Lobo DO    Admission Diagnoses: Acute upper respiratory infection [J06.9]  Hypoxia [R09.02]  Bronchospasm [J98.01]  Atrial fibrillation with rapid ventricular response (CMS/HCC) [I48.91]  Acute on chronic combined systolic and diastolic congestive heart failure (CMS/HCC) [I50.43]  Non-sustained ventricular tachycardia (CMS/HCC) [I47.2]    Discharge Diagnoses:   Acute hypoxic respirator failure, stable  Acute/chronic heart failure due to nonischemic cardiomyopathy. ACC/AHA Stage C, NYHA Function Class III.   Severe MVR/TVR  Acute kidney injury d/t diuretics, improved  RSV positive upper respiratory tract infection  H/o VTach, suppressed on mexiletine  Atrial fibrillation, paroxysmal, rate controlled  Chronic anticoagulation  H/o brain tumor  Physical deconditioning at baseline    Admission Condition: fair    Discharged Condition: poor    Indication for Admission: heart failure    Hospital Course: 61 y/o F with known NICM and TVR/MVR and recurrent heart failure exacerbations who p/w another heart failure exacerbation.  Patient started on IV diuretics and [place] [facility] [place]  AHFT and Valve team consulted.  Volume status slowly improved.  Respiratory status complicated by RSV URTI.  Eventually, patient had KARLEE and deemed not a candidate for mitral clipping.  Palliative care consulted and will continue to follow patient.  Patient aware her prognosis is poor, but she wants to continue fighting.  Remains Full code.  Prognosis poor.    Consults: electrophysiology, heart failure, valve team, urology, infectious disease    Significant Diagnostic Studies: see Hospital course and EPIC for more details    Treatments: see Hospital course and [facility] for more details    Discharge  Exam:  GEN: NAD. AOx3  SKIN: No rashes or lesions   HEENT: NC, AT.   NECK: Full ROM. No thyromegaly or palpable masses. Trachea midline  CHEST: Respirations are non-labored. Improved breath sounds bilaterally.  CV: RRR. S1, S2 normal. No S3, S4. No murmurs, rubs, gallops  ABDOMEN: Soft, non-tender. No rebound or guarding. No masses or hepatosplenomegaly  EXT: No cyanosis or edema. No joint effusion  NEURO: CNs II-XII grossly intact. No gross motor or sensory deficits  PSYCH: Affect appropriate. Mood stable. Memory intact    Discharge Medication List: Please see discharge med reconciliation.    Disposition: Home with home health care    Patient Instructions:   Activity: activity as tolerated  Diet: cardiac diet  Wound Care: none needed    Follow-up with AHFT in 1 week.  Follow-up with palliative care as needed.  Patient with high risk for complications and recurrent heart failure admissions.    Greater than 35 minutes spent discharging patient.     Patient is a 62y Male who presents with a chief complaint of Right Flank pain (30 Nov 2017 01:08)    Patient seen and examined at bedside, No acute overnight events. Today says right flank pain is well controlled on pain medications. Patient has been voiding, getting OOB to chair, with last BM two days ago. He denies  n/v, fever, chest pain, SOB, abdominal pain, diarrhea, constipation, calf pain.    ROS: Neg except as above.    Vital Signs Last 24 Hrs  T(C): 37.2 (01 Dec 2017 05:00), Max: 37.2 (01 Dec 2017 05:00)  T(F): 99 (01 Dec 2017 05:00), Max: 99 (01 Dec 2017 05:00)  HR: 96 (01 Dec 2017 05:00) (85 - 101)  BP: 136/80 (01 Dec 2017 05:00) (136/80 - 155/93)  RR: 18 (01 Dec 2017 05:00) (18 - 20)  SpO2: 99% (30 Nov 2017 20:30) (99% - 99%)      PHYSICAL EXAM: Vital signs reviewed.  General: NAD, sitting comfortably in the chair  HEENT: NCAT, PERRLA, EOMI  Cardiovascular: RRR, +S1/S2, no murmurs  Respiratory: CTAB, no wheeze, rales, rhonchi  Abdomen: +BS, soft, ND, milf suprapubic fullness and tenderness on palpation, no rebound, guarding or rigidity  Back: no CVA tenderness  Extremities: No cyanosis, edema or calf tenderness    Labs:                        13.2   12.1  )-----------( 196      ( 01 Dec 2017 06:03 )             38.0      12-01    133<L>  |  94<L>  |  16.0  ----------------------------<  99  4.5   |  23.0  |  1.81<H>    Ca    8.6      01 Dec 2017 06:03  Phos  2.6     11-30  Mg     2.2     11-30    TPro  6.7  /  Alb  3.5  /  TBili  0.9  /  DBili  x   /  AST  16  /  ALT  13  /  AlkPhos  53  11-30    Radiology: Images reviewed by me.  CT- Renal Vincent  < from: CT Renal Stone Hunt (11.29.17 @ 17:34) >     EXAM:  CT RENAL STONE HUNT                          PROCEDURE DATE:  11/29/2017      IMPRESSION:   Obstructing distal right ureteral 3 mm stone causing   moderate right hydroureteronephrosis with perinephric and periureteric   stranding as described.  Atypical a buckle third segment of duodenal sweep without proximal   obstruction.  Bilateral renal calyceal nonobstructing 1-2 mm stones.    Left inguinal hernia.        Medications:  MEDICATIONS  (STANDING):  enoxaparin Injectable 40 milliGRAM(s) SubCutaneous every 24 hours  famotidine    Tablet 20 milliGRAM(s) Oral daily  influenza   Vaccine 0.5 milliLiter(s) IntraMuscular once  multiple electrolytes Injection Type 1 1000 milliLiter(s) (150 mL/Hr) IV Continuous <Continuous>  tamsulosin 0.4 milliGRAM(s) Oral at bedtime    MEDICATIONS  (PRN):  morphine  - Injectable 2 milliGRAM(s) IV Push every 6 hours PRN Severe Pain (7 - 10)  oxyCODONE    5 mG/acetaminophen 325 mG 2 Tablet(s) Oral every 6 hours PRN Moderate Pain (4 - 6)  polyethylene glycol 3350 17 Gram(s) Oral daily PRN Constipation

## 2018-01-10 ENCOUNTER — APPOINTMENT (OUTPATIENT)
Dept: UROLOGY | Facility: CLINIC | Age: 63
End: 2018-01-10

## 2018-03-02 ENCOUNTER — APPOINTMENT (OUTPATIENT)
Dept: NEUROLOGY | Facility: CLINIC | Age: 63
End: 2018-03-02

## 2020-08-31 NOTE — ED STATDOCS - CROS ED GU ALL NEG
Spoke to mom. MRI hand scheduled for this Thursday 9/3 at 9am. I let mom know that someone from the Rehab Dept should be calling them to schedule an appointment with Occupational Therapy, if she does not hear from them by this week, please call me back.   - - -

## 2021-03-17 PROBLEM — N20.0 CALCULUS OF KIDNEY: Chronic | Status: ACTIVE | Noted: 2017-11-26

## 2021-03-25 ENCOUNTER — APPOINTMENT (OUTPATIENT)
Dept: NEPHROLOGY | Facility: CLINIC | Age: 66
End: 2021-03-25
Payer: MEDICARE

## 2021-03-25 VITALS
OXYGEN SATURATION: 97 % | WEIGHT: 190.7 LBS | BODY MASS INDEX: 25.83 KG/M2 | HEART RATE: 84 BPM | HEIGHT: 72 IN | TEMPERATURE: 98.1 F | DIASTOLIC BLOOD PRESSURE: 90 MMHG | SYSTOLIC BLOOD PRESSURE: 132 MMHG

## 2021-03-25 DIAGNOSIS — Z72.89 OTHER PROBLEMS RELATED TO LIFESTYLE: ICD-10-CM

## 2021-03-25 DIAGNOSIS — Z80.0 FAMILY HISTORY OF MALIGNANT NEOPLASM OF DIGESTIVE ORGANS: ICD-10-CM

## 2021-03-25 PROCEDURE — 99204 OFFICE O/P NEW MOD 45 MIN: CPT

## 2021-03-25 RX ORDER — OXYCODONE AND ACETAMINOPHEN 5; 325 MG/1; MG/1
5-325 TABLET ORAL
Qty: 30 | Refills: 0 | Status: DISCONTINUED | COMMUNITY
Start: 2017-12-07 | End: 2021-03-25

## 2021-03-25 RX ORDER — TAMSULOSIN HYDROCHLORIDE 0.4 MG/1
0.4 CAPSULE ORAL
Qty: 7 | Refills: 0 | Status: DISCONTINUED | COMMUNITY
Start: 2017-11-28 | End: 2021-03-25

## 2021-03-25 RX ORDER — UBIDECARENONE 100 MG
100 CAPSULE ORAL
Refills: 0 | Status: DISCONTINUED | COMMUNITY
End: 2021-03-25

## 2021-03-25 RX ORDER — LORATADINE 5 MG
5 TABLET,CHEWABLE ORAL
Refills: 0 | Status: ACTIVE | COMMUNITY

## 2021-03-25 NOTE — HISTORY OF PRESENT ILLNESS
[FreeTextEntry1] : Patient presented for evaluation of abnormal renal function as well as hyperkalemia.  In 2017, his baseline creatinine was 1.25 mg/dl.  He had been placed on Bactrim prophylaxis for prostate biopsy starting 2/15/21 for 7 total days and was restarted on a 21 day course of Bactrim on 2/25/21, completed on 3/19/21, as treatment for sinus infection, per ENT.  Creatinine prior to any Bactrim was 1.18, peaked at 1.49.  This was accompanied by hyperkalemia and peripheral eosinophilia.  Medication was stopped 3/19/21.  Labs 3/23/21 showed improved creatinine to 1.14, stabilized potassium at 5.4.  Review of further history showed nephrolithiasis 2010 and 2017 but was without calculus analysis.  In addition, he stated use of about 30+ NSAIDs per month for 3-4 years.  This was for management of various joint and muscle aches.  He has stopped using all NSAIDs.

## 2021-03-25 NOTE — PHYSICAL EXAM
[General Appearance - Alert] : alert [General Appearance - In No Acute Distress] : in no acute distress [General Appearance - Well Nourished] : well nourished [General Appearance - Well Developed] : well developed [General Appearance - Well-Appearing] : healthy appearing [PERRL With Normal Accommodation] : pupils were equal in size, round, and reactive to light [Extraocular Movements] : extraocular movements were intact [Outer Ear] : the ears and nose were normal in appearance [Neck Appearance] : the appearance of the neck was normal [Jugular Venous Distention Increased] : there was no jugular-venous distention [] : no respiratory distress [Respiration, Rhythm And Depth] : normal respiratory rhythm and effort [Exaggerated Use Of Accessory Muscles For Inspiration] : no accessory muscle use [Auscultation Breath Sounds / Voice Sounds] : lungs were clear to auscultation bilaterally [Heart Sounds] : normal S1 and S2 [Arterial Pulses Carotid] : carotid pulses were normal with no bruits [Edema] : there was no peripheral edema [Bowel Sounds] : normal bowel sounds [Abdomen Soft] : soft [Abdomen Tenderness] : non-tender [No CVA Tenderness] : no ~M costovertebral angle tenderness [Abnormal Walk] : normal gait [Skin Color & Pigmentation] : normal skin color and pigmentation [No Focal Deficits] : no focal deficits [Oriented To Time, Place, And Person] : oriented to person, place, and time [Impaired Insight] : insight and judgment were intact [Affect] : the affect was normal [Mood] : the mood was normal [Memory Recent] : recent memory was not impaired [Memory Remote] : remote memory was not impaired

## 2021-03-25 NOTE — ASSESSMENT
[FreeTextEntry1] : 1. Abnormal renal function - based upon rise of creatinine during use of Bactrim, along with accompanying peripheral eosinophilia, and thereafter improving upon discontinuation, it would appear that he may have had acute interstitial nephritis related to Bactrim.  Therefore, would not use Bactrim any further and classify as allergic reaction to the drug.  Would repeat labs in one month to assess for stability.  Most recent renal ultrasound was unremarkable.  Recent urinalysis likewise unremarkable.  Underlying this is possible CKD due to long term NSAID use.  He was instructed to stop its use as well.  For now, he needs to avoid NSAIDs, intravenous contrast exposure and any other nephrotoxic drugs.  Further recommendations per repeat labs.\par \par 2. Hyperkalemia - likely was due to AIN.  Now improving.  Reviewed avoidance of high potassium foods.  Repeat in about 4 weeks.  \par \par 3. Nephrolithiasis - given recurrence of calculus within 10 year time frame, he remains at risk of repeat stone formations.  Would assess with 24 hour urine collection for saturation kinetics.  Will also review accompanying preventative labs.  Further recommendations per results.\par \par Reviewed with patient.  Question answered.  Follow up in about 6 weeks.

## 2021-06-24 NOTE — ED ADULT NURSE NOTE - GENITOURINARY WDL
1. Even though you are not in the hospital now, your recovery is still very important to us. How are you feeling? \"Good.\"      2. One of the reasons for the call today is that we want to make sure you understand all of the discharge instructions that were given to you. Do you have any questions about how to take care of yourself and your baby at home? \"No.'    5. How are you managing your pain at home and is it effective? \"Yes not having any pain anymore\"    6. Do you have any feeding concerns or other questions about your baby? \"No. She's a good eater.\"    7. Do you know how to contact lactation services if you needed them? \"Yes.\"    8. Did you schedule follow up appointments for you and the baby? \"Yes.\"    Patient stated that everything thing with her stay was good. However, if she could change something it would be that [place] at home would have enough bili blankets to send home. Looking for one extended their stay and if they wouldn't have found one from another facility they would've had to stay another night.   Bladder non-tender and non-distended. Urine clear yellow. "low urine pressure"

## 2021-10-26 ENCOUNTER — EMERGENCY (EMERGENCY)
Facility: HOSPITAL | Age: 66
LOS: 1 days | Discharge: HOME | End: 2021-10-26
Attending: EMERGENCY MEDICINE
Payer: MEDICARE

## 2021-10-26 VITALS
DIASTOLIC BLOOD PRESSURE: 84 MMHG | TEMPERATURE: 99 F | RESPIRATION RATE: 18 BRPM | WEIGHT: 184.97 LBS | HEIGHT: 73 IN | OXYGEN SATURATION: 97 % | HEART RATE: 99 BPM | SYSTOLIC BLOOD PRESSURE: 127 MMHG

## 2021-10-26 DIAGNOSIS — Z98.890 OTHER SPECIFIED POSTPROCEDURAL STATES: Chronic | ICD-10-CM

## 2021-10-26 LAB
ALBUMIN SERPL ELPH-MCNC: 4.2 G/DL — SIGNIFICANT CHANGE UP (ref 3.3–5.2)
ALP SERPL-CCNC: 81 U/L — SIGNIFICANT CHANGE UP (ref 40–120)
ALT FLD-CCNC: 34 U/L — SIGNIFICANT CHANGE UP
ANION GAP SERPL CALC-SCNC: 12 MMOL/L — SIGNIFICANT CHANGE UP (ref 5–17)
APPEARANCE UR: CLEAR — SIGNIFICANT CHANGE UP
AST SERPL-CCNC: 28 U/L — SIGNIFICANT CHANGE UP
BASOPHILS # BLD AUTO: 0.05 K/UL — SIGNIFICANT CHANGE UP (ref 0–0.2)
BASOPHILS NFR BLD AUTO: 0.7 % — SIGNIFICANT CHANGE UP (ref 0–2)
BILIRUB SERPL-MCNC: 0.4 MG/DL — SIGNIFICANT CHANGE UP (ref 0.4–2)
BILIRUB UR-MCNC: NEGATIVE — SIGNIFICANT CHANGE UP
BUN SERPL-MCNC: 12.1 MG/DL — SIGNIFICANT CHANGE UP (ref 8–20)
CALCIUM SERPL-MCNC: 9.4 MG/DL — SIGNIFICANT CHANGE UP (ref 8.6–10.2)
CHLORIDE SERPL-SCNC: 102 MMOL/L — SIGNIFICANT CHANGE UP (ref 98–107)
CO2 SERPL-SCNC: 25 MMOL/L — SIGNIFICANT CHANGE UP (ref 22–29)
COLOR SPEC: SIGNIFICANT CHANGE UP
CREAT SERPL-MCNC: 1.17 MG/DL — SIGNIFICANT CHANGE UP (ref 0.5–1.3)
DIFF PNL FLD: NEGATIVE — SIGNIFICANT CHANGE UP
EOSINOPHIL # BLD AUTO: 0.21 K/UL — SIGNIFICANT CHANGE UP (ref 0–0.5)
EOSINOPHIL NFR BLD AUTO: 3.1 % — SIGNIFICANT CHANGE UP (ref 0–6)
EPI CELLS # UR: NEGATIVE — SIGNIFICANT CHANGE UP
GLUCOSE SERPL-MCNC: 136 MG/DL — HIGH (ref 70–99)
GLUCOSE UR QL: NEGATIVE MG/DL — SIGNIFICANT CHANGE UP
HCT VFR BLD CALC: 50.3 % — HIGH (ref 39–50)
HGB BLD-MCNC: 16.6 G/DL — SIGNIFICANT CHANGE UP (ref 13–17)
IMM GRANULOCYTES NFR BLD AUTO: 1 % — SIGNIFICANT CHANGE UP (ref 0–1.5)
KETONES UR-MCNC: NEGATIVE — SIGNIFICANT CHANGE UP
LEUKOCYTE ESTERASE UR-ACNC: NEGATIVE — SIGNIFICANT CHANGE UP
LIDOCAIN IGE QN: 40 U/L — SIGNIFICANT CHANGE UP (ref 22–51)
LYMPHOCYTES # BLD AUTO: 1.49 K/UL — SIGNIFICANT CHANGE UP (ref 1–3.3)
LYMPHOCYTES # BLD AUTO: 22.1 % — SIGNIFICANT CHANGE UP (ref 13–44)
MCHC RBC-ENTMCNC: 29.3 PG — SIGNIFICANT CHANGE UP (ref 27–34)
MCHC RBC-ENTMCNC: 33 GM/DL — SIGNIFICANT CHANGE UP (ref 32–36)
MCV RBC AUTO: 88.9 FL — SIGNIFICANT CHANGE UP (ref 80–100)
MONOCYTES # BLD AUTO: 0.55 K/UL — SIGNIFICANT CHANGE UP (ref 0–0.9)
MONOCYTES NFR BLD AUTO: 8.2 % — SIGNIFICANT CHANGE UP (ref 2–14)
NEUTROPHILS # BLD AUTO: 4.36 K/UL — SIGNIFICANT CHANGE UP (ref 1.8–7.4)
NEUTROPHILS NFR BLD AUTO: 64.9 % — SIGNIFICANT CHANGE UP (ref 43–77)
NITRITE UR-MCNC: NEGATIVE — SIGNIFICANT CHANGE UP
PH UR: 6 — SIGNIFICANT CHANGE UP (ref 5–8)
PLATELET # BLD AUTO: 217 K/UL — SIGNIFICANT CHANGE UP (ref 150–400)
POTASSIUM SERPL-MCNC: 4.2 MMOL/L — SIGNIFICANT CHANGE UP (ref 3.5–5.3)
POTASSIUM SERPL-SCNC: 4.2 MMOL/L — SIGNIFICANT CHANGE UP (ref 3.5–5.3)
PROT SERPL-MCNC: 7.3 G/DL — SIGNIFICANT CHANGE UP (ref 6.6–8.7)
PROT UR-MCNC: 30 MG/DL
RBC # BLD: 5.66 M/UL — SIGNIFICANT CHANGE UP (ref 4.2–5.8)
RBC # FLD: 13.8 % — SIGNIFICANT CHANGE UP (ref 10.3–14.5)
RBC CASTS # UR COMP ASSIST: SIGNIFICANT CHANGE UP /HPF (ref 0–4)
SODIUM SERPL-SCNC: 139 MMOL/L — SIGNIFICANT CHANGE UP (ref 135–145)
SP GR SPEC: 1.01 — SIGNIFICANT CHANGE UP (ref 1.01–1.02)
UROBILINOGEN FLD QL: NEGATIVE MG/DL — SIGNIFICANT CHANGE UP
WBC # BLD: 6.73 K/UL — SIGNIFICANT CHANGE UP (ref 3.8–10.5)
WBC # FLD AUTO: 6.73 K/UL — SIGNIFICANT CHANGE UP (ref 3.8–10.5)
WBC UR QL: SIGNIFICANT CHANGE UP

## 2021-10-26 PROCEDURE — 80053 COMPREHEN METABOLIC PANEL: CPT

## 2021-10-26 PROCEDURE — 36415 COLL VENOUS BLD VENIPUNCTURE: CPT

## 2021-10-26 PROCEDURE — G1004: CPT

## 2021-10-26 PROCEDURE — 81001 URINALYSIS AUTO W/SCOPE: CPT

## 2021-10-26 PROCEDURE — 99284 EMERGENCY DEPT VISIT MOD MDM: CPT | Mod: 25

## 2021-10-26 PROCEDURE — 99284 EMERGENCY DEPT VISIT MOD MDM: CPT

## 2021-10-26 PROCEDURE — 74177 CT ABD & PELVIS W/CONTRAST: CPT | Mod: ME

## 2021-10-26 PROCEDURE — 85025 COMPLETE CBC W/AUTO DIFF WBC: CPT

## 2021-10-26 PROCEDURE — 83690 ASSAY OF LIPASE: CPT

## 2021-10-26 PROCEDURE — 74177 CT ABD & PELVIS W/CONTRAST: CPT | Mod: 26,ME

## 2021-10-26 RX ORDER — IOHEXOL 300 MG/ML
30 INJECTION, SOLUTION INTRAVENOUS ONCE
Refills: 0 | Status: COMPLETED | OUTPATIENT
Start: 2021-10-26 | End: 2021-10-26

## 2021-10-26 RX ORDER — METHOCARBAMOL 500 MG/1
1000 TABLET, FILM COATED ORAL ONCE
Refills: 0 | Status: COMPLETED | OUTPATIENT
Start: 2021-10-26 | End: 2021-10-26

## 2021-10-26 RX ORDER — METHOCARBAMOL 500 MG/1
2 TABLET, FILM COATED ORAL
Qty: 24 | Refills: 0
Start: 2021-10-26 | End: 2021-10-29

## 2021-10-26 RX ADMIN — METHOCARBAMOL 1000 MILLIGRAM(S): 500 TABLET, FILM COATED ORAL at 15:21

## 2021-10-26 RX ADMIN — IOHEXOL 30 MILLILITER(S): 300 INJECTION, SOLUTION INTRAVENOUS at 15:21

## 2021-10-26 NOTE — ED STATDOCS - NS_ ATTENDINGSCRIBEDETAILS _ED_A_ED_FT
I, Abdulaziz Landa, performed the initial face to face bedside interview with this patient regarding history of present illness, review of symptoms and relevant past medical, social and family history.  I completed an independent physical examination.  I was the provider who initially evaluated this patient.  The history, relevant review of systems, past medical and surgical history, medical decision making, and physical examination was documented by the scribe in my presence and I attest to the accuracy of the documentation. Follow-up on ordered tests (ie labs, radiologic studies) and re-evaluation of the patient's status has been communicated to the ACP.  Disposition of the patient will be based on test outcome and response to ED interventions.

## 2021-10-26 NOTE — ED STATDOCS - OBJECTIVE STATEMENT
67 y/o male with PMHx of kidney stone in 2017 and in hernia, flank pain. Patient reports 2 weeks ago in [place] having onset right flank pain and now is radiating to the abdomen. Patient noticed that the region is swelling and increased in pain. Patient saw a urologies who had a CT and renal work up done  which was normal. Allergic to Bactrim. Denies smoking, 67 y/o male with PMHx of kidney stone in 2017 and in hernia, flank pain. Patient reports right flank pain starting approx 2 weeks ago while on GOlf outing in [place]. Pain persisted and pt saw urologist (Malika):  sent for CT which pt reports was negative.  Treated with flomax and pain meds.  Saw GP yesterday for worsening pain which is now radiating to right abdomen.  Also notes swelling to right flank since last night.  Reports pain is better with application of heat and worse with certain positions.  Denies n/v.  Denies fever.  Denies dysuria or hematuria.

## 2021-10-26 NOTE — ED STATDOCS - PATIENT PORTAL LINK FT
You can access the FollowMyHealth Patient Portal offered by [facility] by registering at the following website: [email or web address]. By joining [employer]’s FollowMyHealth portal, you will also be able to view your health information using other applications (apps) compatible with our system.

## 2021-10-26 NOTE — ED STATDOCS - PROGRESS NOTE DETAILS
PT evaluated by intake physician. HPI/PE/ROS as noted above. Will follow up plan per intake physician. Pt with no acute findings on CT. Labs wnl. pt feeling better. Advised to f/u with PMD and come back with new or worsening symptoms.

## 2021-10-26 NOTE — ED ADULT TRIAGE NOTE - CHIEF COMPLAINT QUOTE
Patient arrived to ED today with c/o back pains, lower right flank pain for two weeks, saw urologist, negative CT scan one week ago, hx kidney stones, symptoms worse last 2 days, saw GP yesterday, blood in urine yesterday was seen.  Patient states symptoms worse today so came to ED.

## 2021-10-26 NOTE — ED STATDOCS - CLINICAL SUMMARY MEDICAL DECISION MAKING FREE TEXT BOX
Right flank and RLQ pain possible Kidney stone vs appy; labs, ct and empiric treatment with Robaxin.

## 2021-11-11 ENCOUNTER — APPOINTMENT (OUTPATIENT)
Dept: NEPHROLOGY | Facility: CLINIC | Age: 66
End: 2021-11-11
Payer: MEDICARE

## 2021-11-11 ENCOUNTER — NON-APPOINTMENT (OUTPATIENT)
Age: 66
End: 2021-11-11

## 2021-11-11 VITALS
WEIGHT: 188.49 LBS | HEART RATE: 85 BPM | DIASTOLIC BLOOD PRESSURE: 77 MMHG | BODY MASS INDEX: 25.53 KG/M2 | OXYGEN SATURATION: 96 % | TEMPERATURE: 97.7 F | HEIGHT: 72 IN | SYSTOLIC BLOOD PRESSURE: 113 MMHG

## 2021-11-11 PROCEDURE — 99214 OFFICE O/P EST MOD 30 MIN: CPT

## 2021-11-12 NOTE — HISTORY OF PRESENT ILLNESS
[FreeTextEntry1] : Patient presented for evaluation of abnormal renal function as well as hyperkalemia.  In 2017, his baseline creatinine was 1.25 mg/dl.  He had been placed on Bactrim prophylaxis for prostate biopsy starting 2/15/21 for 7 total days and was restarted on a 21 day course of Bactrim on 2/25/21, completed on 3/19/21, as treatment for sinus infection, per ENT.  Creatinine prior to any Bactrim was 1.18, peaked at 1.49.  This was accompanied by hyperkalemia and peripheral eosinophilia.  Medication was stopped 3/19/21.  Labs 3/23/21 showed improved creatinine to 1.14, stabilized potassium at 5.4.  Review of further history showed nephrolithiasis 2010 and 2017 but was without calculus analysis.  In addition, he stated use of about 30+ NSAIDs per month for 3-4 years.  This was for management of various joint and muscle aches.  He has stopped using all NSAIDs. In the interim since his last visit, he had to go to ER for flank pain.  CT showed bilateral nonobstructing small stones (3 mm right and 2 mm left) and right renal cyst.  MRI spine was performed for further evaluation of pain.  He awaits discussion of results with PMD.

## 2021-11-12 NOTE — ASSESSMENT
[FreeTextEntry1] : 1. Abnormal renal function - renal function is improved off of Bactrim and NSAIDs though with persistent mild increase in creatinine (1.17- 1.07).  Continue to monitor for baseline.  A recent urinalysis showed mild proteinuria.  Will order spot urine for protein/creatinine ratio prior to next visit.  He should continue to avoid NSAIDs, intravenous contrast whenever possible as well as other nephrotoxic drugs.  Further recommendations per clinical course.\par \par 2. Hyperkalemia - currently stable.  Continue to monitor.  \par \par 3. Nephrolithiasis - 24 hour urine showed normal saturation kinetics along with optimal pH.  No recommendations at this time.  Will repeat once more prior to next visit to assure ongoing stability.\par \par 4. Right renal cyst - periodically monitor for stability.\par \par Reviewed with patient.  Question answered.  Follow up 4 months.

## 2022-01-26 NOTE — ED ADULT NURSE NOTE - NSSISCREENINGQ1_ED_A_ED
Sathish Kinsey    Your urine does not show any infection and your Wet prep is normal. I would recommend following up in clinic to discuss your symptoms further. I did put your urine in a culture as well.   Thanks for choosing us as your health care partner,    KALA Dee CNP    Dysuria with Uncertain Cause (Adult)    The urethra is the tube that allows urine to pass out of the body. In a woman, the urethra is the opening above the vagina. In men, the urethra is the opening on the tip of the penis. Dysuria is the feeling of pain or burning in the urethra when passing urine.   Dysuria can be caused by anything that irritates or inflames the urethra. An infection or chemical irritation can cause this reaction. A bladder infection is the most common cause of dysuria in adults. A urine test can diagnose this. A bladder infection needs antibiotic treatment.   Soaps, lotions, colognes, and feminine hygiene products can cause dysuria. So can birth control jellies, creams, and foams. It will go away 1 to 3 days after using these irritants.   Sexually transmitted infections (STIs) such as chlamydia or gonorrhea can cause dysuria. Your healthcare provider may take a culture sample. Your provider may start you on antibiotic medicine before the culture test returns.   In women who have gone through menopause, dysuria can be from dryness in the lining of the urethra. This can be treated with hormones. Dysuria becomes long-term (chronic) when it lasts for weeks or months. You may need to see a specialist (urologist) to diagnose and treat chronic dysuria.   Home care  These home care tips may help:    Don't use any chemicals or products that you think may be causing your symptoms.    If you were given a prescription medicine, take as directed. Take it until it is all used up.    If a culture was taken, don't have sex until you have been told that it is negative. A negative culture means you don't have 
an infection. Then follow your healthcare provider's advice to treat your condition.  If a culture was done and it is positive:     Both you and your sexual partner may need to be treated. This is true even if your partner has no symptoms.    Contact your healthcare provider or go to an urgent care clinic or the public health department to be looked at and treated.    Don't have sex until both you and your partner have finished all antibiotics and your healthcare provider says you are no longer contagious.    Learn about and use safe sex practices. The safest sex is with a partner who has tested negative and only has sex with you. Condoms can prevent STIs from spreading, but they aren't a guarantee.  Follow-up care  Follow up with your healthcare provider, or as advised. If a culture was taken, you may call as directed for the results. If you have an STI, follow up with your provider or the public health department for a complete STI screening, including HIV testing. For more information, contact CDC-INFO at 102-727-7634.   When to get medical advice  Call your healthcare provider right away if any of these occur:    You aren't better after 3 days of treatment    Fever of 100.4 F (38 C) or higher, or as directed by your provider    Back or belly pain that gets worse    You can't urinate because of pain    New discharge from the urethra, vagina, or penis    Painful sores on the penis    Rash or joint pain    Painful lumps (lymph nodes) in the groin    Testicle pain or swelling of the scrotum  [employer] last reviewed this educational content on 10/1/2019    2645-1263 [employer]. All rights reserved. This information is not intended as a substitute for professional medical care. Always follow your healthcare professional's instructions.        
General
No

## 2022-02-16 NOTE — ED PROVIDER NOTE - DATE/TIME 1
I called her. She is doing much improved. Has not heard from ENT or radiology. Told her to contact us on Monday if she hasn't heard about the [place]>    KAH   28-Nov-2017 07:23

## 2022-08-01 ENCOUNTER — APPOINTMENT (OUTPATIENT)
Dept: OPHTHALMOLOGY | Facility: CLINIC | Age: 67
End: 2022-08-01

## 2022-08-01 ENCOUNTER — NON-APPOINTMENT (OUTPATIENT)
Age: 67
End: 2022-08-01

## 2022-08-01 PROCEDURE — 92201 OPSCPY EXTND RTA DRAW UNI/BI: CPT | Mod: RT

## 2022-08-01 PROCEDURE — 92004 COMPRE OPH EXAM NEW PT 1/>: CPT

## 2022-08-22 ENCOUNTER — NON-APPOINTMENT (OUTPATIENT)
Age: 67
End: 2022-08-22

## 2022-08-22 ENCOUNTER — APPOINTMENT (OUTPATIENT)
Dept: OPHTHALMOLOGY | Facility: CLINIC | Age: 67
End: 2022-08-22

## 2022-08-22 PROCEDURE — 92202 OPSCPY EXTND ON/MAC DRAW: CPT | Mod: RT

## 2022-08-22 PROCEDURE — 99213 OFFICE O/P EST LOW 20 MIN: CPT

## 2022-11-22 ENCOUNTER — APPOINTMENT (OUTPATIENT)
Dept: OPHTHALMOLOGY | Facility: CLINIC | Age: 67
End: 2022-11-22

## 2022-12-14 ENCOUNTER — APPOINTMENT (OUTPATIENT)
Dept: NEPHROLOGY | Facility: CLINIC | Age: 67
End: 2022-12-14

## 2022-12-14 VITALS
SYSTOLIC BLOOD PRESSURE: 122 MMHG | TEMPERATURE: 97.5 F | WEIGHT: 182 LBS | DIASTOLIC BLOOD PRESSURE: 68 MMHG | HEIGHT: 72 IN | OXYGEN SATURATION: 98 % | HEART RATE: 93 BPM | BODY MASS INDEX: 24.65 KG/M2

## 2022-12-14 DIAGNOSIS — N28.1 CYST OF KIDNEY, ACQUIRED: ICD-10-CM

## 2022-12-14 DIAGNOSIS — Z87.448 PERSONAL HISTORY OF OTHER DISEASES OF URINARY SYSTEM: ICD-10-CM

## 2022-12-14 DIAGNOSIS — E87.5 HYPERKALEMIA: ICD-10-CM

## 2022-12-14 DIAGNOSIS — N18.2 CHRONIC KIDNEY DISEASE, STAGE 2 (MILD): ICD-10-CM

## 2022-12-14 DIAGNOSIS — N20.0 CALCULUS OF KIDNEY: ICD-10-CM

## 2022-12-14 PROCEDURE — 99214 OFFICE O/P EST MOD 30 MIN: CPT

## 2022-12-15 NOTE — HISTORY OF PRESENT ILLNESS
[FreeTextEntry1] : Patient previously presented for evaluation of abnormal renal function as well as hyperkalemia.  In 2017, his baseline creatinine was 1.25 mg/dl.  He had been placed on Bactrim prophylaxis for prostate biopsy starting 2/15/21 for 7 total days and was restarted on a 21 day course of Bactrim on 2/25/21, completed on 3/19/21, as treatment for sinus infection, per ENT.  Creatinine prior to any Bactrim was 1.18, peaked at 1.49.  This was accompanied by hyperkalemia and peripheral eosinophilia.  Medication was stopped 3/19/21.  Labs 3/23/21 showed improved creatinine to 1.14, stabilized potassium at 5.4.  Review of further history showed nephrolithiasis 2010 and 2017 but was without calculus analysis.  In addition, he stated use of about 30+ NSAIDs per month for 3-4 years.  This was for management of various joint and muscle aches.  He has stopped using all NSAIDs. Previous ER for flank pain showed bilateral nonobstructing small stones (3 mm right and 2 mm left) and right renal cyst.  MRI spine was performed for further evaluation of pain.  Stone was subsequently passed.  Currently without complaints.

## 2022-12-15 NOTE — ASSESSMENT
[FreeTextEntry1] : 1. Abnormal renal function - renal function is improved off of Bactrim and NSAIDs though with persistent mild increase in creatinine,currently at 1.19.  Continue to monitor.   Will repeat spot urine for protein/creatinine ratio prior to next visit.  He should continue to avoid NSAIDs, intravenous contrast whenever possible as well as other nephrotoxic drugs.  Further recommendations per clinical course.\par \par 2. Hyperkalemia - currently stable, level at 5.1.  Continue to monitor.  \par \par 3. Nephrolithiasis - 24 hour urine showed normal saturation kinetics along with optimal pH (6.4).  Volume was 2800 ml per day and urine calcium 2.3 mg/dl.  No recommendations at this time.  This represents two stable collections consecutively.  Will repeat in one year. \par \par 4. Right renal cyst - periodically monitor for stability per Urology discretion\par \par Reviewed with patient.  Question answered.  Follow up one year.

## 2022-12-27 NOTE — ED STATDOCS - CHPI ED SYMPTOM POS
Pre-Visit Planning   Next 5 appointments (look out 90 days)    Dec 30, 2022  3:20 PM  PHYSICAL with Erika Plascencia MD  [facility] ([facility] ) [address]  [place] [id number] [phone]        Appointment Notes for this encounter:   Medicare Annual Wellness PX    Questionnaires Reviewed/Assigned  No additional questionnaires are needed    Patient preferred phone number: [phone]    Unable to reach patient and unable to leave voicemail.     Amy Smith RN on 12/27/2022 at 2:08 PM       PAIN/CONSTIPATION

## 2023-03-23 NOTE — ED ADULT TRIAGE NOTE - CHIEF COMPLAINT QUOTE
Left multiple messages on phone. Will wait for a response from family.   Pt was seen her twice for a kidney stone, discharged yesterday morning.  Pt states his pain level now is 6/10

## 2024-11-21 NOTE — PATIENT PROFILE ADULT. - FUNCTIONAL SCREEN CURRENT LEVEL: AMBULATION, MLM
Expected Date Of Service: 11/21/2024 Billing Type: Third-Party Bill Performing Laboratory: [id number] Bill For Surgical Tray: no Lab Facility: 0 (0) [phone]

## 2024-12-25 PROBLEM — F10.90 ALCOHOL USE: Status: ACTIVE | Noted: 2017-12-06

## 2025-05-13 ENCOUNTER — APPOINTMENT (OUTPATIENT)
Dept: ORTHOPEDIC SURGERY | Facility: CLINIC | Age: 70
End: 2025-05-13
Payer: MEDICARE

## 2025-05-13 VITALS — BODY MASS INDEX: 24.52 KG/M2 | HEIGHT: 73 IN | WEIGHT: 185 LBS

## 2025-05-13 DIAGNOSIS — M25.552 PAIN IN LEFT HIP: ICD-10-CM

## 2025-05-13 DIAGNOSIS — M16.12 UNILATERAL PRIMARY OSTEOARTHRITIS, LEFT HIP: ICD-10-CM

## 2025-05-13 PROCEDURE — 73502 X-RAY EXAM HIP UNI 2-3 VIEWS: CPT

## 2025-05-13 PROCEDURE — 99204 OFFICE O/P NEW MOD 45 MIN: CPT

## 2025-05-13 RX ORDER — MELOXICAM 15 MG/1
15 TABLET ORAL
Qty: 30 | Refills: 0 | Status: ACTIVE | COMMUNITY
Start: 2025-05-13 | End: 1900-01-01

## 2025-05-29 ENCOUNTER — APPOINTMENT (OUTPATIENT)
Dept: MRI IMAGING | Facility: CLINIC | Age: 70
End: 2025-05-29
Payer: MEDICARE

## 2025-05-29 ENCOUNTER — APPOINTMENT (OUTPATIENT)
Dept: ORTHOPEDIC SURGERY | Facility: CLINIC | Age: 70
End: 2025-05-29

## 2025-05-29 PROCEDURE — 73721 MRI JNT OF LWR EXTRE W/O DYE: CPT | Mod: LT

## 2025-06-05 ENCOUNTER — APPOINTMENT (OUTPATIENT)
Dept: ORTHOPEDIC SURGERY | Facility: CLINIC | Age: 70
End: 2025-06-05
Payer: MEDICARE

## 2025-06-05 VITALS — HEIGHT: 73 IN | BODY MASS INDEX: 24.52 KG/M2 | WEIGHT: 185 LBS

## 2025-06-05 DIAGNOSIS — M16.12 UNILATERAL PRIMARY OSTEOARTHRITIS, LEFT HIP: ICD-10-CM

## 2025-06-05 PROCEDURE — 99214 OFFICE O/P EST MOD 30 MIN: CPT

## 2025-06-05 RX ORDER — MELOXICAM 15 MG/1
15 TABLET ORAL
Qty: 45 | Refills: 1 | Status: ACTIVE | COMMUNITY
Start: 2025-06-05 | End: 1900-01-01